# Patient Record
Sex: FEMALE | Race: WHITE | NOT HISPANIC OR LATINO | Employment: FULL TIME | URBAN - METROPOLITAN AREA
[De-identification: names, ages, dates, MRNs, and addresses within clinical notes are randomized per-mention and may not be internally consistent; named-entity substitution may affect disease eponyms.]

---

## 2017-11-24 ENCOUNTER — HOSPITAL ENCOUNTER (EMERGENCY)
Facility: HOSPITAL | Age: 22
Discharge: HOME | End: 2017-11-24
Attending: EMERGENCY MEDICINE | Admitting: EMERGENCY MEDICINE

## 2017-11-24 VITALS
BODY MASS INDEX: 22.43 KG/M2 | SYSTOLIC BLOOD PRESSURE: 123 MMHG | OXYGEN SATURATION: 98 % | HEIGHT: 68 IN | TEMPERATURE: 98.2 F | WEIGHT: 148 LBS | HEART RATE: 68 BPM | DIASTOLIC BLOOD PRESSURE: 91 MMHG | RESPIRATION RATE: 18 BRPM

## 2017-11-24 DIAGNOSIS — N76.0 BACTERIAL VAGINOSIS: Primary | ICD-10-CM

## 2017-11-24 DIAGNOSIS — B96.89 BACTERIAL VAGINOSIS: Primary | ICD-10-CM

## 2017-11-24 LAB
ALBUMIN SERPL-MCNC: 4.5 G/DL (ref 3.5–5.2)
ALBUMIN/GLOB SERPL: 1.5 G/DL
ALP SERPL-CCNC: 66 U/L (ref 39–117)
ALT SERPL W P-5'-P-CCNC: 63 U/L (ref 1–33)
ANION GAP SERPL CALCULATED.3IONS-SCNC: 10.4 MMOL/L
AST SERPL-CCNC: 44 U/L (ref 1–32)
BACTERIA UR QL AUTO: NORMAL /HPF
BASOPHILS # BLD AUTO: 0.02 10*3/MM3 (ref 0–0.2)
BASOPHILS NFR BLD AUTO: 0.4 % (ref 0–1.5)
BILIRUB SERPL-MCNC: 0.8 MG/DL (ref 0.1–1.2)
BILIRUB UR QL STRIP: NEGATIVE
BUN BLD-MCNC: 10 MG/DL (ref 6–20)
BUN/CREAT SERPL: 12.7 (ref 7–25)
CALCIUM SPEC-SCNC: 9.9 MG/DL (ref 8.6–10.5)
CHLORIDE SERPL-SCNC: 103 MMOL/L (ref 98–107)
CLARITY UR: CLEAR
CLUE CELLS SPEC QL WET PREP: ABNORMAL
CO2 SERPL-SCNC: 27.6 MMOL/L (ref 22–29)
COLOR UR: YELLOW
CREAT BLD-MCNC: 0.79 MG/DL (ref 0.57–1)
DEPRECATED RDW RBC AUTO: 40.4 FL (ref 37–54)
EOSINOPHIL # BLD AUTO: 0.1 10*3/MM3 (ref 0–0.7)
EOSINOPHIL NFR BLD AUTO: 1.8 % (ref 0.3–6.2)
ERYTHROCYTE [DISTWIDTH] IN BLOOD BY AUTOMATED COUNT: 12.7 % (ref 11.7–13)
GFR SERPL CREATININE-BSD FRML MDRD: 90 ML/MIN/1.73
GLOBULIN UR ELPH-MCNC: 3.1 GM/DL
GLUCOSE BLD-MCNC: 95 MG/DL (ref 65–99)
GLUCOSE UR STRIP-MCNC: NEGATIVE MG/DL
HCG SERPL QL: NEGATIVE
HCT VFR BLD AUTO: 48.1 % (ref 35.6–45.5)
HGB BLD-MCNC: 16 G/DL (ref 11.9–15.5)
HGB UR QL STRIP.AUTO: NEGATIVE
HOLD SPECIMEN: NORMAL
HYALINE CASTS UR QL AUTO: NORMAL /LPF
HYDATID CYST SPEC WET PREP: ABNORMAL
IMM GRANULOCYTES # BLD: 0 10*3/MM3 (ref 0–0.03)
IMM GRANULOCYTES NFR BLD: 0 % (ref 0–0.5)
KETONES UR QL STRIP: NEGATIVE
KOH PREP NAIL: NORMAL
LEUKOCYTE ESTERASE UR QL STRIP.AUTO: ABNORMAL
LIPASE SERPL-CCNC: 21 U/L (ref 13–60)
LYMPHOCYTES # BLD AUTO: 2.49 10*3/MM3 (ref 0.9–4.8)
LYMPHOCYTES NFR BLD AUTO: 44.9 % (ref 19.6–45.3)
MCH RBC QN AUTO: 29.6 PG (ref 26.9–32)
MCHC RBC AUTO-ENTMCNC: 33.3 G/DL (ref 32.4–36.3)
MCV RBC AUTO: 88.9 FL (ref 80.5–98.2)
MONOCYTES # BLD AUTO: 0.33 10*3/MM3 (ref 0.2–1.2)
MONOCYTES NFR BLD AUTO: 5.9 % (ref 5–12)
NEUTROPHILS # BLD AUTO: 2.61 10*3/MM3 (ref 1.9–8.1)
NEUTROPHILS NFR BLD AUTO: 47 % (ref 42.7–76)
NITRITE UR QL STRIP: NEGATIVE
PH UR STRIP.AUTO: 7.5 [PH] (ref 5–8)
PLATELET # BLD AUTO: 241 10*3/MM3 (ref 140–500)
PMV BLD AUTO: 10.2 FL (ref 6–12)
POTASSIUM BLD-SCNC: 4.4 MMOL/L (ref 3.5–5.2)
PROT SERPL-MCNC: 7.6 G/DL (ref 6–8.5)
PROT UR QL STRIP: NEGATIVE
RBC # BLD AUTO: 5.41 10*6/MM3 (ref 3.9–5.2)
RBC # UR: NORMAL /HPF
REF LAB TEST METHOD: NORMAL
SODIUM BLD-SCNC: 141 MMOL/L (ref 136–145)
SP GR UR STRIP: 1.01 (ref 1–1.03)
SQUAMOUS #/AREA URNS HPF: NORMAL /HPF
T VAGINALIS SPEC QL WET PREP: ABNORMAL
UROBILINOGEN UR QL STRIP: ABNORMAL
WBC NRBC COR # BLD: 5.55 10*3/MM3 (ref 4.5–10.7)
WBC SPEC QL WET PREP: ABNORMAL
WBC UR QL AUTO: NORMAL /HPF
WHOLE BLOOD HOLD SPECIMEN: NORMAL
WHOLE BLOOD HOLD SPECIMEN: NORMAL
YEAST GENITAL QL WET PREP: ABNORMAL

## 2017-11-24 PROCEDURE — 87491 CHLMYD TRACH DNA AMP PROBE: CPT | Performed by: EMERGENCY MEDICINE

## 2017-11-24 PROCEDURE — 99284 EMERGENCY DEPT VISIT MOD MDM: CPT

## 2017-11-24 PROCEDURE — 83690 ASSAY OF LIPASE: CPT | Performed by: EMERGENCY MEDICINE

## 2017-11-24 PROCEDURE — 84703 CHORIONIC GONADOTROPIN ASSAY: CPT | Performed by: EMERGENCY MEDICINE

## 2017-11-24 PROCEDURE — 36415 COLL VENOUS BLD VENIPUNCTURE: CPT | Performed by: EMERGENCY MEDICINE

## 2017-11-24 PROCEDURE — 25010000002 CEFTRIAXONE PER 250 MG: Performed by: EMERGENCY MEDICINE

## 2017-11-24 PROCEDURE — 96372 THER/PROPH/DIAG INJ SC/IM: CPT

## 2017-11-24 PROCEDURE — 85025 COMPLETE CBC W/AUTO DIFF WBC: CPT | Performed by: EMERGENCY MEDICINE

## 2017-11-24 PROCEDURE — 81001 URINALYSIS AUTO W/SCOPE: CPT | Performed by: EMERGENCY MEDICINE

## 2017-11-24 PROCEDURE — 87220 TISSUE EXAM FOR FUNGI: CPT | Performed by: EMERGENCY MEDICINE

## 2017-11-24 PROCEDURE — 80053 COMPREHEN METABOLIC PANEL: CPT | Performed by: EMERGENCY MEDICINE

## 2017-11-24 PROCEDURE — 87591 N.GONORRHOEAE DNA AMP PROB: CPT | Performed by: EMERGENCY MEDICINE

## 2017-11-24 PROCEDURE — 87210 SMEAR WET MOUNT SALINE/INK: CPT | Performed by: EMERGENCY MEDICINE

## 2017-11-24 RX ORDER — AZITHROMYCIN 250 MG/1
1000 TABLET, FILM COATED ORAL ONCE
Status: COMPLETED | OUTPATIENT
Start: 2017-11-24 | End: 2017-11-24

## 2017-11-24 RX ORDER — LIDOCAINE HYDROCHLORIDE 10 MG/ML
1 INJECTION, SOLUTION INFILTRATION; PERINEURAL ONCE
Status: COMPLETED | OUTPATIENT
Start: 2017-11-24 | End: 2017-11-24

## 2017-11-24 RX ORDER — CEFTRIAXONE SODIUM 250 MG/1
250 INJECTION, POWDER, FOR SOLUTION INTRAMUSCULAR; INTRAVENOUS ONCE
Status: COMPLETED | OUTPATIENT
Start: 2017-11-24 | End: 2017-11-24

## 2017-11-24 RX ORDER — SODIUM CHLORIDE 0.9 % (FLUSH) 0.9 %
10 SYRINGE (ML) INJECTION AS NEEDED
Status: DISCONTINUED | OUTPATIENT
Start: 2017-11-24 | End: 2017-11-24 | Stop reason: HOSPADM

## 2017-11-24 RX ORDER — METRONIDAZOLE 500 MG/1
500 TABLET ORAL 2 TIMES DAILY
Qty: 14 TABLET | Refills: 0 | Status: SHIPPED | OUTPATIENT
Start: 2017-11-24 | End: 2018-07-31

## 2017-11-24 RX ADMIN — LIDOCAINE HYDROCHLORIDE 1 ML: 10 INJECTION, SOLUTION INFILTRATION; PERINEURAL at 15:03

## 2017-11-24 RX ADMIN — CEFTRIAXONE SODIUM 250 MG: 250 INJECTION, POWDER, FOR SOLUTION INTRAMUSCULAR; INTRAVENOUS at 15:08

## 2017-11-24 RX ADMIN — AZITHROMYCIN 1000 MG: 250 TABLET, FILM COATED ORAL at 15:01

## 2017-11-24 NOTE — ED PROVIDER NOTES
EMERGENCY DEPARTMENT ENCOUNTER    CHIEF COMPLAINT  Chief Complaint: lower abdominal pain  History given by: pt  History limited by: nothing  Room Number: 25/25  PMD: Loretta Vazquez MD      HPI:  Pt is a 21 y.o. female who presents complaining of lower abdominal cramping which began about a week and a half ago. The pt states that she thought she had a yeast infection and took monistat with no relief. The pt went to an urgent care earlier today who sent her to the ED for further evaluation. The pt states that she has had unusual vaginal discharge (white) for about 2 weeks and nausea. The pt denies dysuria. The pt's last menstrual period was about 2 weeks ago. The pt denies aggravating and alleviating factors. The pt states that she is not currently taking any antibiotics. The pt states that she occasionally drinks EtOH.      Duration:  About a week and a half  Onset: gradual  Timing: constant  Location: lower abdomen  Radiation: none  Quality: cramping  Intensity/Severity: moderate  Progression: unchanged  Associated Symptoms: unusual vaginal discharge (white) and nausea  Aggravating Factors: none  Alleviating Factors: none  Previous Episodes: none  Treatment before arrival: The pt has taken monistat with no relief.    PAST MEDICAL HISTORY  Active Ambulatory Problems     Diagnosis Date Noted   • No Active Ambulatory Problems     Resolved Ambulatory Problems     Diagnosis Date Noted   • No Resolved Ambulatory Problems     Past Medical History:   Diagnosis Date   • ADHD (attention deficit hyperactivity disorder)    • Exercise-induced asthma    • LGSIL of cervix of undetermined significance    • Mitral valve prolapse    • Ovarian cyst    • Pulmonary valve regurgitation    • Rhinitis, chronic    • Scoliosis    • Seasonal allergies        PAST SURGICAL HISTORY  Past Surgical History:   Procedure Laterality Date   • WISDOM TOOTH EXTRACTION         FAMILY HISTORY  Family History   Problem Relation Age of Onset   •  Hypertension Mother    • No Known Problems Father        SOCIAL HISTORY  Social History     Social History   • Marital status: Single     Spouse name: N/A   • Number of children: N/A   • Years of education: N/A     Occupational History   • Not on file.     Social History Main Topics   • Smoking status: Never Smoker   • Smokeless tobacco: Not on file   • Alcohol use No   • Drug use: No   • Sexual activity: Defer     Other Topics Concern   • Not on file     Social History Narrative   • No narrative on file       ALLERGIES  Morphine and related and Adhesive tape    REVIEW OF SYSTEMS  Review of Systems   Constitutional: Negative for fever.   HENT: Negative for sore throat.    Eyes: Negative.    Respiratory: Negative for cough and shortness of breath.    Cardiovascular: Negative for chest pain.   Gastrointestinal: Positive for abdominal pain (lower) and nausea. Negative for diarrhea and vomiting.   Genitourinary: Positive for vaginal discharge. Negative for dysuria.   Musculoskeletal: Negative for neck pain.   Skin: Negative for rash.   Allergic/Immunologic: Negative.    Neurological: Negative for weakness, numbness and headaches.   Hematological: Negative.    Psychiatric/Behavioral: Negative.    All other systems reviewed and are negative.      PHYSICAL EXAM  ED Triage Vitals   Temp Heart Rate Resp BP SpO2   11/24/17 1115 11/24/17 1115 11/24/17 1115 11/24/17 1132 11/24/17 1115   98.9 °F (37.2 °C) 68 20 137/85 98 %      Temp src Heart Rate Source Patient Position BP Location FiO2 (%)   -- -- 11/24/17 1132 11/24/17 1132 --     Sitting Left arm        Physical Exam   Constitutional: She is oriented to person, place, and time. She appears distressed (mild).   HENT:   Head: Normocephalic and atraumatic.   Eyes: EOM are normal. Pupils are equal, round, and reactive to light.   Neck: Normal range of motion.   Cardiovascular: Normal rate, regular rhythm and normal heart sounds.    No murmur heard.  Pulmonary/Chest: Effort  normal and breath sounds normal. No respiratory distress.   Abdominal: Soft. Bowel sounds are normal. She exhibits no distension. There is no tenderness. There is no CVA tenderness.   Genitourinary: Right adnexa normal, left adnexa normal and vulva normal. Uterus is not enlarged, not fixed and not tender. Cervix exhibits motion tenderness and tenderness. Cervix exhibits no lesion. Vagina exhibits exudate. Creamy  acrid  white  green and vaginal discharge found.   Neurological: She is alert and oriented to person, place, and time. She has normal sensation and normal strength.   Skin: Skin is warm and dry.   Psychiatric: Affect normal.   Nursing note and vitals reviewed.      LAB RESULTS  Lab Results (last 24 hours)     Procedure Component Value Units Date/Time    POCT Urinalysis (automated dipstick) [[id number]]  (Abnormal) Collected:  11/24/17 1046    Specimen:  Urine Updated:  11/24/17 1047     Color Yellow     Clarity, UA Clear     Glucose, UA Negative mg/dL      Bilirubin Negative     Ketones, UA Negative     Specific Gravity  1.015     Blood, UA Negative     pH, Urine 7.0     Protein, POC Negative mg/dL      Urobilinogen, UA Normal     Leukocytes Trace (A)     Nitrite, UA Negative    POCT Pregnancy, urine [[id number]]  (Normal) Collected:  11/24/17 1048    Specimen:  Urine Updated:  11/24/17 1049     HCG, Urine, QL Negative     Lot Number [id number]     Internal Positive Control Positive     Internal Negative Control Negative    CBC & Differential [[id number]] Collected:  11/24/17 1147    Specimen:  Blood Updated:  11/24/17 1200    Narrative:       The following orders were created for panel order CBC & Differential.  Procedure                               Abnormality         Status                     ---------                               -----------         ------                     CBC Auto Differential[[id number]]        Abnormal            Final result                 Please view results for these tests on  the individual orders.    Comprehensive Metabolic Panel [[id number]]  (Abnormal) Collected:  11/24/17 1147    Specimen:  Blood Updated:  11/24/17 1218     Glucose 95 mg/dL      BUN 10 mg/dL      Creatinine 0.79 mg/dL      Sodium 141 mmol/L      Potassium 4.4 mmol/L      Chloride 103 mmol/L      CO2 27.6 mmol/L      Calcium 9.9 mg/dL      Total Protein 7.6 g/dL      Albumin 4.50 g/dL      ALT (SGPT) 63 (H) U/L      AST (SGOT) 44 (H) U/L      Alkaline Phosphatase 66 U/L      Total Bilirubin 0.8 mg/dL      eGFR Non African Amer 90 mL/min/1.73      Globulin 3.1 gm/dL      A/G Ratio 1.5 g/dL      BUN/Creatinine Ratio 12.7     Anion Gap 10.4 mmol/L     Lipase [[id number]]  (Normal) Collected:  11/24/17 1147    Specimen:  Blood Updated:  11/24/17 1218     Lipase 21 U/L     hCG, Serum, Qualitative [[id number]]  (Normal) Collected:  11/24/17 1147    Specimen:  Blood Updated:  11/24/17 1219     HCG Qualitative Negative    CBC Auto Differential [[id number]]  (Abnormal) Collected:  11/24/17 1147    Specimen:  Blood Updated:  11/24/17 1200     WBC 5.55 10*3/mm3      RBC 5.41 (H) 10*6/mm3      Hemoglobin 16.0 (H) g/dL      Hematocrit 48.1 (H) %      MCV 88.9 fL      MCH 29.6 pg      MCHC 33.3 g/dL      RDW 12.7 %      RDW-SD 40.4 fl      MPV 10.2 fL      Platelets 241 10*3/mm3      Neutrophil % 47.0 %      Lymphocyte % 44.9 %      Monocyte % 5.9 %      Eosinophil % 1.8 %      Basophil % 0.4 %      Immature Grans % 0.0 %      Neutrophils, Absolute 2.61 10*3/mm3      Lymphocytes, Absolute 2.49 10*3/mm3      Monocytes, Absolute 0.33 10*3/mm3      Eosinophils, Absolute 0.10 10*3/mm3      Basophils, Absolute 0.02 10*3/mm3      Immature Grans, Absolute 0.00 10*3/mm3     Urinalysis With / Culture If Indicated - Urine, Clean Catch [[id number]]  (Abnormal) Collected:  11/24/17 1149    Specimen:  Urine from Urine, Clean Catch Updated:  11/24/17 1202     Color, UA Yellow     Appearance, UA Clear     pH, UA 7.5     Specific [place], UA 1.006      Glucose, UA Negative     Ketones, UA Negative     Bilirubin, UA Negative     Blood, UA Negative     Protein, UA Negative     Leuk Esterase, UA Small (1+) (A)     Nitrite, UA Negative     Urobilinogen, UA 0.2 E.U./dL    Urinalysis, Microscopic Only - Urine, Clean Catch [[id number]] Collected:  11/24/17 1149    Specimen:  Urine from Urine, Clean Catch Updated:  11/24/17 1202     RBC, UA 0-2 /HPF      WBC, UA 0-2 /HPF      Bacteria, UA None Seen /HPF      Squamous Epithelial Cells, UA 0-2 /HPF      Hyaline Casts, UA None Seen /LPF      Methodology Automated Microscopy    Chlamydia trachomatis, Neisseria gonorrhoeae, PCR - Swab, Vagina [[id number]] Collected:  11/24/17 1435    Specimen:  Swab from Vagina Updated:  11/24/17 1442    LEODAN Prep - Swab, Vagina [[id number]]  (Normal) Collected:  11/24/17 1435    Specimen:  Swab from Vagina Updated:  11/24/17 1459     KOH Prep No yeast or hyphal elements seen    Wet Prep, Genital - Swab, Vagina [[id number]]  (Abnormal) Collected:  11/24/17 1435    Specimen:  Swab from Vagina Updated:  11/24/17 1459     YEAST No yeast seen     HYPHAL ELEMENTS No Hyphal elements seen     WBC'S 2+ WBC's seen (A)     Clue Cells, Wet Prep No Clue cells seen     Trichomonas, Wet Prep No Trichomonas seen          I ordered the above labs and reviewed the results      PROCEDURES  Procedures      PROGRESS AND CONSULTS    1135: Ordered labs for further evaluation.     1427: Ordered labs for further evaluation.     1430: Performed pelvic exam with chaperone present.     1527: Rechecked pt, she was resting comfortably. Discussed vaginal swab results which showed bacteria present. Pt's chlamydia and gonorrhoeae results will be back in a few days. Discussed plan to discharge pt with antibiotics. The pt agrees with and understands plan to discharge. All questions were addressed at this time.     MEDICAL DECISION MAKING  Results were reviewed/discussed with the patient and they were also made aware of online  access. Pt also made aware that some labs, such as cultures, will not be resulted during ER visit and follow up with PMD is necessary.     MDM  Number of Diagnoses or Management Options     Amount and/or Complexity of Data Reviewed  Clinical lab tests: ordered and reviewed (WBC: 5.55, Hgb: 16.0, WBC vaginal swab: 2+, Leuk Esterase, UA: small 1+)  Decide to obtain previous medical records or to obtain history from someone other than the patient: yes  Review and summarize past medical records: yes    Patient Progress  Patient progress: stable         DIAGNOSIS  Final diagnoses:   Bacterial vaginosis       DISPOSITION  DISCHARGE    Patient discharged in stable condition.    Reviewed implications of results, diagnosis, meds, responsibility to follow up, warning signs and symptoms of possible worsening, potential complications and reasons to return to ER..    Patient/Family voiced understanding of above instructions.    Discussed plan for discharge, as there is no emergent indication for admission.  Pt/family is agreeable and understands need for follow up and repeat testing.  Pt is aware that discharge does not mean that nothing is wrong but it indicates no emergency is present that requires admission and they must continue care with follow-up as given below or physician of their choice.     FOLLOW-UP  Loretta Vazquez MD  [address] [facility] [address] [phone]      As needed         Medication List      New Prescriptions          metroNIDAZOLE 500 MG tablet   Commonly known as:  FLAGYL   Take 1 tablet by mouth 2 (Two) Times a Day.         Stop          amoxicillin 875 MG tablet   Commonly known as:  AMOXIL       BIOTIN PO       nitrofurantoin (macrocrystal-monohydrate) 100 MG capsule   Commonly known as:  MACROBID       phenazopyridine 200 MG tablet   Commonly known as:  PYRIDIUM               Latest Documented Vital Signs:  As of 8:38 PM  BP- 123/91 HR- 68 Temp- 98.2 °F (36.8 °C) (Oral) O2 sat-  98%    --  Documentation assistance provided by lainey Ng for Dr. Brush.  Information recorded by the scribe was done at my direction and has been verified and validated by me.     Nehal Ng  11/24/17 1529       Kaushik Brush MD  11/24/17 2032

## 2017-11-24 NOTE — ED NOTES
Pt has had cramping on and off 10 days, then vaginal discharge started to smell bad, pt treated with OTC yeast infection medication, but symptoms are still continuing. Pt also has had a cold.       Brunilda Conde RN  11/24/17 3879

## 2017-11-27 LAB
C TRACH RRNA SPEC DONR QL NAA+PROBE: NEGATIVE
N GONORRHOEA DNA SPEC QL NAA+PROBE: NEGATIVE

## 2018-07-31 ENCOUNTER — OFFICE VISIT (OUTPATIENT)
Dept: INTERNAL MEDICINE | Facility: CLINIC | Age: 23
End: 2018-07-31

## 2018-07-31 VITALS
OXYGEN SATURATION: 98 % | SYSTOLIC BLOOD PRESSURE: 110 MMHG | HEART RATE: 75 BPM | WEIGHT: 148 LBS | BODY MASS INDEX: 22.43 KG/M2 | HEIGHT: 68 IN | DIASTOLIC BLOOD PRESSURE: 68 MMHG

## 2018-07-31 DIAGNOSIS — G47.00 INSOMNIA, UNSPECIFIED TYPE: ICD-10-CM

## 2018-07-31 DIAGNOSIS — F90.9 ATTENTION DEFICIT HYPERACTIVITY DISORDER (ADHD), UNSPECIFIED ADHD TYPE: ICD-10-CM

## 2018-07-31 DIAGNOSIS — F41.9 ANXIETY: ICD-10-CM

## 2018-07-31 DIAGNOSIS — S43.014A ANTERIOR DISLOCATION OF RIGHT SHOULDER, INITIAL ENCOUNTER: Primary | ICD-10-CM

## 2018-07-31 DIAGNOSIS — F32.A DEPRESSION, UNSPECIFIED DEPRESSION TYPE: ICD-10-CM

## 2018-07-31 DIAGNOSIS — Z79.899 HIGH RISK MEDICATION USE: ICD-10-CM

## 2018-07-31 PROCEDURE — 99214 OFFICE O/P EST MOD 30 MIN: CPT | Performed by: FAMILY MEDICINE

## 2018-07-31 RX ORDER — BIOTIN 10 MG
1 TABLET ORAL DAILY
COMMUNITY
End: 2022-11-06

## 2018-07-31 NOTE — PROGRESS NOTES
Subjective   Sujey Schofield is a 22 y.o. female.     Chief Complaint   Patient presents with   • ADD     vyvanse previously 50mg but caused severe depression   • Shoulder Injury     right , pops out socket easily   • Insomnia     trouble falling asleep   • Establish Care         History of Present Illness     Patient notes that she has ADHD. She notes that in the past she had Vyvanse and believed she was on a high dose and didn't like the way she felt. She states she ended up in the hospital due to the depression that was related to being on vyvanse. Patient notes that she has had appropriate diagnosis and testing done for adhd while at her previous healthcare at .    Patient notes that she has not been able to sleep well over several weeks. Patient notes that she has a hard time falling asleep.     Patient notes she had a shoulder injury few years back. She notes that she tore her rotator cuff, and notes that since then, she has had many multiple dislocation, which she manually has to place back in. She notes its painful. She says total of 6 dislocations in the past year. She notes that sometimes the shoulder can dislocate when she does activities such as putting a cold on.  She states that one time it dislocated while he was in her sleep.    Patient does not she has some underlying depression over the last few months.  Patient notes that she's having a tough time after her cat .  Patient states that she does feel as if her mood is depressed.  Patient also notes that anxiety and depression with things that she might of had earlier on, but notes that she never truly addressed this.  Patient also notes that she has some underlying anxiety, as she feels that sometimes heart is racing.  Patient is unsure if her anxiety could be contributing to her sleep disturbances.      The following portions of the patient's history were reviewed and updated as appropriate: allergies, current medications, past family  history, past medical history, past social history, past surgical history and problem list.    Review of Systems   Constitutional: Negative for chills and fever.   HENT: Negative for congestion, rhinorrhea, sinus pain and sore throat.    Eyes: Negative for photophobia and visual disturbance.   Respiratory: Negative for cough, chest tightness and shortness of breath.    Cardiovascular: Negative for chest pain and palpitations.   Gastrointestinal: Negative for diarrhea, nausea and vomiting.   Genitourinary: Negative for dysuria, frequency and urgency.   Skin: Negative for rash and wound.   Neurological: Negative for dizziness and syncope.   Psychiatric/Behavioral: Positive for decreased concentration, dysphoric mood and sleep disturbance. Negative for behavioral problems and confusion.       Objective   Physical Exam   Constitutional: She is oriented to person, place, and time. She appears well-developed and well-nourished.   HENT:   Head: Normocephalic and atraumatic.   Right Ear: External ear normal.   Left Ear: External ear normal.   Mouth/Throat: Oropharynx is clear and moist.   Eyes: EOM are normal.   Neck: Normal range of motion. Neck supple.   Cardiovascular: Normal rate, regular rhythm and normal heart sounds.    Pulmonary/Chest: Effort normal and breath sounds normal. No respiratory distress.   Musculoskeletal: Normal range of motion.   Lymphadenopathy:     She has no cervical adenopathy.   Neurological: She is alert and oriented to person, place, and time.   Skin: Skin is warm.   Psychiatric: She has a normal mood and affect. Her behavior is normal.   Nursing note and vitals reviewed.      Assessment/Plan   Sujey was seen today for add, shoulder injury, insomnia and establish care.    Diagnoses and all orders for this visit:    Anterior dislocation of right shoulder, initial encounter  -     Ambulatory Referral to Orthopedic Surgery  -     Advised the patient that she should follow-up with orthopedic to have  evaluation done for her shoulder.  Patient may need further imaging such as an MRI.    Attention deficit hyperactivity disorder (ADHD), unspecified ADHD type        -     At today's office visit is evident the patient most likely has ADHD.  It is evident in conversation with patient at today's visit, that she did easily loses train of thought and can be distracted.  I discussed with patient that if she has been formally diagnosed by her specialist and at the [facility], she should provide those records.  Once the records are received, patient can be prescribed Vyvanse at a lower dose.    Insomnia, unspecified type        -     I discussed patient that her insomnia is most likely related to some underlying depression and anxiety that she may have.  I discussed with her that we will start her on medication which may help the patient.  Will reevaluate in next office visit.    Anxiety  -     Vortioxetine HBr (TRINTELLIX) 10 MG tablet; Take 10 mg by mouth Daily.    Depression, unspecified depression type  -     Vortioxetine HBr (TRINTELLIX) 10 MG tablet; Take 10 mg by mouth Daily.    High risk medication use  -     Comprehensive Metabolic Panel  -     CBC & Differential          No Follow-up on file.    Dictated utilizing Dragon Voice Recognition Software

## 2018-08-01 LAB
ALBUMIN SERPL-MCNC: 4.9 G/DL (ref 3.5–5.2)
ALBUMIN/GLOB SERPL: 1.9 G/DL
ALP SERPL-CCNC: 78 U/L (ref 39–117)
ALT SERPL-CCNC: 20 U/L (ref 1–33)
AST SERPL-CCNC: 22 U/L (ref 1–32)
BASOPHILS # BLD AUTO: 0.03 10*3/MM3 (ref 0–0.2)
BASOPHILS NFR BLD AUTO: 0.4 % (ref 0–1.5)
BILIRUB SERPL-MCNC: 0.5 MG/DL (ref 0.1–1.2)
BUN SERPL-MCNC: 8 MG/DL (ref 6–20)
BUN/CREAT SERPL: 10.1 (ref 7–25)
CALCIUM SERPL-MCNC: 10.3 MG/DL (ref 8.6–10.5)
CHLORIDE SERPL-SCNC: 101 MMOL/L (ref 98–107)
CO2 SERPL-SCNC: 25.3 MMOL/L (ref 22–29)
CREAT SERPL-MCNC: 0.79 MG/DL (ref 0.57–1)
EOSINOPHIL # BLD AUTO: 0.21 10*3/MM3 (ref 0–0.7)
EOSINOPHIL NFR BLD AUTO: 2.5 % (ref 0.3–6.2)
ERYTHROCYTE [DISTWIDTH] IN BLOOD BY AUTOMATED COUNT: 12.2 % (ref 11.7–13)
GLOBULIN SER CALC-MCNC: 2.6 GM/DL
GLUCOSE SERPL-MCNC: 85 MG/DL (ref 65–99)
HCT VFR BLD AUTO: 49.2 % (ref 35.6–45.5)
HGB BLD-MCNC: 15.9 G/DL (ref 11.9–15.5)
IMM GRANULOCYTES # BLD: 0.02 10*3/MM3 (ref 0–0.03)
IMM GRANULOCYTES NFR BLD: 0.2 % (ref 0–0.5)
LYMPHOCYTES # BLD AUTO: 3.99 10*3/MM3 (ref 0.9–4.8)
LYMPHOCYTES NFR BLD AUTO: 47.2 % (ref 19.6–45.3)
MCH RBC QN AUTO: 28.9 PG (ref 26.9–32)
MCHC RBC AUTO-ENTMCNC: 32.3 G/DL (ref 32.4–36.3)
MCV RBC AUTO: 89.3 FL (ref 80.5–98.2)
MONOCYTES # BLD AUTO: 0.46 10*3/MM3 (ref 0.2–1.2)
MONOCYTES NFR BLD AUTO: 5.4 % (ref 5–12)
NEUTROPHILS # BLD AUTO: 3.74 10*3/MM3 (ref 1.9–8.1)
NEUTROPHILS NFR BLD AUTO: 44.3 % (ref 42.7–76)
PLATELET # BLD AUTO: 305 10*3/MM3 (ref 140–500)
POTASSIUM SERPL-SCNC: 4.3 MMOL/L (ref 3.5–5.2)
PROT SERPL-MCNC: 7.5 G/DL (ref 6–8.5)
RBC # BLD AUTO: 5.51 10*6/MM3 (ref 3.9–5.2)
SODIUM SERPL-SCNC: 143 MMOL/L (ref 136–145)
WBC # BLD AUTO: 8.45 10*3/MM3 (ref 4.5–10.7)

## 2018-08-02 ENCOUNTER — TELEPHONE (OUTPATIENT)
Dept: INTERNAL MEDICINE | Facility: CLINIC | Age: 23
End: 2018-08-02

## 2018-08-02 NOTE — TELEPHONE ENCOUNTER
----- Message from Last Kern MD sent at 8/1/2018  9:00 AM EDT -----  The labs were reviewed. Please inform patient that labs were normal.

## 2018-09-04 ENCOUNTER — OFFICE VISIT (OUTPATIENT)
Dept: INTERNAL MEDICINE | Facility: CLINIC | Age: 23
End: 2018-09-04

## 2018-09-04 VITALS
OXYGEN SATURATION: 98 % | HEIGHT: 68 IN | BODY MASS INDEX: 24.44 KG/M2 | DIASTOLIC BLOOD PRESSURE: 64 MMHG | SYSTOLIC BLOOD PRESSURE: 114 MMHG | HEART RATE: 67 BPM | WEIGHT: 161.3 LBS

## 2018-09-04 DIAGNOSIS — F41.9 ANXIETY: ICD-10-CM

## 2018-09-04 DIAGNOSIS — G47.00 INSOMNIA, UNSPECIFIED TYPE: ICD-10-CM

## 2018-09-04 DIAGNOSIS — F32.A DEPRESSION, UNSPECIFIED DEPRESSION TYPE: Primary | ICD-10-CM

## 2018-09-04 PROCEDURE — 99214 OFFICE O/P EST MOD 30 MIN: CPT | Performed by: FAMILY MEDICINE

## 2018-09-04 RX ORDER — BUPROPION HYDROCHLORIDE 300 MG/1
300 TABLET ORAL DAILY
Qty: 30 TABLET | Refills: 6 | Status: SHIPPED | OUTPATIENT
Start: 2018-09-04 | End: 2019-02-20

## 2018-09-05 NOTE — PROGRESS NOTES
Subjective   Sujey Schofield is a 22 y.o. female.     Chief Complaint   Patient presents with   • ADHD   • Anxiety   • Insomnia   • Depression         History of Present Illness     Patient notes that she continues to have insomnia but is not wanting to take any medications for this. Patient believes that its from her anxiety.    Patient notes that she is taking trintellix to help her with the depression and anxiety, however she did note that when she missed some doses, she felt bad. And therefore she stopped taking the medication. Patient does realize that she probably needs to be on medication for depression and anxiety. Patient notes that her depression and anxiety do limit her activities.     The following portions of the patient's history were reviewed and updated as appropriate: allergies, current medications, past family history, past medical history, past social history, past surgical history and problem list.    Review of Systems   Constitutional: Negative for chills and fever.   HENT: Negative for congestion, rhinorrhea, sinus pain and sore throat.    Eyes: Negative for photophobia and visual disturbance.   Respiratory: Negative for cough, chest tightness and shortness of breath.    Cardiovascular: Negative for chest pain and palpitations.   Gastrointestinal: Negative for diarrhea, nausea and vomiting.   Genitourinary: Negative for dysuria, frequency and urgency.   Skin: Negative for rash and wound.   Neurological: Negative for dizziness and syncope.   Psychiatric/Behavioral: Positive for dysphoric mood and sleep disturbance. Negative for behavioral problems and confusion. The patient is nervous/anxious.        Objective   Physical Exam   Constitutional: She is oriented to person, place, and time. She appears well-developed and well-nourished.   HENT:   Head: Normocephalic and atraumatic.   Right Ear: External ear normal.   Left Ear: External ear normal.   Mouth/Throat: Oropharynx is clear and moist.    Eyes: EOM are normal.   Neck: Normal range of motion. Neck supple.   Cardiovascular: Normal rate, regular rhythm and normal heart sounds.    Pulmonary/Chest: Effort normal and breath sounds normal. No respiratory distress.   Musculoskeletal: Normal range of motion.   Lymphadenopathy:     She has no cervical adenopathy.   Neurological: She is alert and oriented to person, place, and time.   Skin: Skin is warm.   Psychiatric: She has a normal mood and affect. Her behavior is normal.   Nursing note and vitals reviewed.      Assessment/Plan   Sujey was seen today for adhd, anxiety, insomnia and depression.    Diagnoses and all orders for this visit:    Depression, unspecified depression type  -     buPROPion XL (WELLBUTRIN XL) 300 MG 24 hr tablet; Take 1 tablet by mouth Daily.    Anxiety  -     buPROPion XL (WELLBUTRIN XL) 300 MG 24 hr tablet; Take 1 tablet by mouth Daily.    Insomnia, unspecified type        -     Discussed good sleep hygiene.           No Follow-up on file.    Dictated utilizing Dragon Voice Recognition Software

## 2018-09-20 ENCOUNTER — APPOINTMENT (OUTPATIENT)
Dept: GENERAL RADIOLOGY | Facility: HOSPITAL | Age: 23
End: 2018-09-20

## 2018-09-20 ENCOUNTER — HOSPITAL ENCOUNTER (EMERGENCY)
Facility: HOSPITAL | Age: 23
Discharge: HOME | End: 2018-09-20
Attending: EMERGENCY MEDICINE | Admitting: EMERGENCY MEDICINE

## 2018-09-20 VITALS
RESPIRATION RATE: 16 BRPM | SYSTOLIC BLOOD PRESSURE: 120 MMHG | DIASTOLIC BLOOD PRESSURE: 73 MMHG | HEIGHT: 67 IN | HEART RATE: 85 BPM | TEMPERATURE: 97.6 F | OXYGEN SATURATION: 100 %

## 2018-09-20 DIAGNOSIS — S43.014A ANTERIOR DISLOCATION OF RIGHT SHOULDER, INITIAL ENCOUNTER: Primary | ICD-10-CM

## 2018-09-20 PROCEDURE — 99283 EMERGENCY DEPT VISIT LOW MDM: CPT

## 2018-09-20 PROCEDURE — 73020 X-RAY EXAM OF SHOULDER: CPT

## 2018-09-20 RX ORDER — LIDOCAINE HYDROCHLORIDE 10 MG/ML
20 INJECTION, SOLUTION EPIDURAL; INFILTRATION; INTRACAUDAL; PERINEURAL ONCE
Status: DISCONTINUED | OUTPATIENT
Start: 2018-09-20 | End: 2018-09-20

## 2018-09-20 RX ORDER — IBUPROFEN 200 MG
800 TABLET ORAL ONCE
Status: COMPLETED | OUTPATIENT
Start: 2018-09-20 | End: 2018-09-20

## 2018-09-20 RX ADMIN — IBUPROFEN 800 MG: 200 TABLET, FILM COATED ORAL at 20:40

## 2018-09-21 NOTE — ED NOTES
MD Mijares at bedside for eval. Xray on the way. Pt reports that while at the gym, her R shoulder became dislocated. She reports that this has happened approx 8 times and she has had it been put into place without sedation at the hospital. Pt appears in pain. Pt's arm propped on pillows for comfort. Reassurance given; call light in reach. Pts breathing even and unlabored. Family at bedside.        Amy Benitez RN  09/20/18 2017

## 2018-09-21 NOTE — ED PROVIDER NOTES
EMERGENCY DEPARTMENT ENCOUNTER    Room Number:  44/44  Date seen:  9/20/2018  Time seen: 8:04 PM  PCP: Last Kern MD  Historian: Patient      HPI:  Chief Complaint: R shoulder injury  Context: Sujey Schofield is a 22 y.o. female who presents to the ED c/o R shoulder injury that occurred today while exercising at the gym at 1945. Pt reports that she has had multiple previous R shoulder dislocation.    Pain Location: R shoulder  Radiation: none  Quality: dislocation  Intensity/Severity: moderate pain  Duration: 30 mins  Onset quality: dislocation  Timing: constant  Progression: unchanged  Aggravating Factors: movement  Alleviating Factors: none  Previous Episodes: hx of previous dislocations of same shoulder  Treatment before arrival: none  Associated Symptoms: none    PAST MEDICAL HISTORY  Active Ambulatory Problems     Diagnosis Date Noted   • Anterior dislocation of right shoulder 07/31/2018   • ADHD (attention deficit hyperactivity disorder) 07/31/2018   • Insomnia 07/31/2018   • Anxiety 07/31/2018   • Depression 07/31/2018     Resolved Ambulatory Problems     Diagnosis Date Noted   • No Resolved Ambulatory Problems     Past Medical History:   Diagnosis Date   • ADHD (attention deficit hyperactivity disorder)    • Exercise-induced asthma    • LGSIL of cervix of undetermined significance    • Mitral valve prolapse    • Ovarian cyst    • Pulmonary valve regurgitation    • Rhinitis, chronic    • Scoliosis    • Seasonal allergies          PAST SURGICAL HISTORY  Past Surgical History:   Procedure Laterality Date   • WISDOM TOOTH EXTRACTION           FAMILY HISTORY  Family History   Problem Relation Age of Onset   • Hypertension Mother    • No Known Problems Father    • Prostate cancer Maternal Grandfather         pancreatic   • Diabetes type II Paternal Grandmother          SOCIAL HISTORY  Social History     Social History   • Marital status: Single     Spouse name: N/A   • Number of children: N/A   • Years of  education: N/A     Occupational History   • Not on file.     Social History Main Topics   • Smoking status: Never Smoker   • Smokeless tobacco: Not on file   • Alcohol use Yes      Comment: 2 drinks week   • Drug use: No   • Sexual activity: Yes     Partners: Male     Other Topics Concern   • Not on file     Social History Narrative   • No narrative on file         ALLERGIES  Morphine and related and Adhesive tape        REVIEW OF SYSTEMS  Review of Systems   Constitutional: Negative for fever.   HENT: Negative for sore throat.    Respiratory: Negative for shortness of breath.    Cardiovascular: Negative for chest pain.   Gastrointestinal: Negative for abdominal pain.   Endocrine: Negative for polyuria.   Genitourinary: Negative for dysuria.   Musculoskeletal: Positive for arthralgias (R shoulder). Negative for neck pain.   Skin: Negative for rash.   Neurological: Negative for headaches.   All other systems reviewed and are negative.           PHYSICAL EXAM  ED Triage Vitals [09/20/18 1956]   Temp Heart Rate Resp BP SpO2   97.6 °F (36.4 °C) 92 26 -- 100 %      Temp src Heart Rate Source Patient Position BP Location FiO2 (%)   Tympanic Monitor -- -- --         GENERAL: not distressed  HENT: nares patent  EYES: no scleral icterus  CV: regular rhythm, regular rate, 2+ right radial pulse with good capillary refill  RESPIRATORY: normal effort  MUSCULOSKELETAL: right shoulder held in abduction, humeral head palpable in anterior location, unable to touch left shoulder with right hand  NEURO: alert, COOPER, FC, normal motor and sensation to axillary nerve and m/r/u nerves  SKIN: warm, dry    Vital signs and nursing notes reviewed.        RADIOLOGY  XR Shoulder 1 View Right   Final Result   LEFT SHOULDER X-RAY     CLINICAL HISTORY: Status post reduction of dislocation.     A single AP view demonstrate satisfactory reduction of the anterior  dislocation shown on previous imaging. No fracture is identified.     This report was  finalized on 9/20/2018 8:43 PM by Dr. Gilles Hardin M.D.   XR Shoulder 1 View Right   Final Result   RIGHT SHOULDER X-RAYS     CLINICAL HISTORY: Right shoulder dislocation.     A single  AP view demonstrates anterior dislocation of the humeral head.  No fracture is identified.     This report was finalized on 9/20/2018 8:42 PM by Dr. Gilles Hardin M.D.             Ordered the above noted radiological studies. Reviewed by me in PACS.          PROCEDURES  Upper Extremity Dislocation  Date/Time: 9/20/2018 8:25 PM  Performed by: DAYLIN FRANCE II  Authorized by: DAYLIN FRANCE II   Consent: Verbal consent obtained.  Risks and benefits: risks, benefits and alternatives were discussed  Consent given by: patient  Patient understanding: patient states understanding of the procedure being performed  Imaging studies: imaging studies available  Patient identity confirmed: verbally with patient, provided demographic data, hospital-assigned identification number and arm band  Injury location: shoulder  Location details: right shoulder  Injury type: dislocation  Dislocation type: anterior  Chronicity: recurrent  Pre-procedure neurovascular assessment: neurovascularly intact  Pre-procedure distal perfusion: normal  Pre-procedure neurological function: normal  Pre-procedure range of motion: reduced    Anesthesia:  Local anesthesia used: no    Sedation:  Patient sedated: no  Manipulation performed: yes  Reduction method: external rotation  Reduction successful: yes  X-ray confirmed reduction: yes  Immobilization: sling  Post-procedure neurovascular assessment: post-procedure neurovascularly intact  Post-procedure distal perfusion: normal  Post-procedure neurological function: normal  Post-procedure range of motion: normal  Patient tolerance: Patient tolerated the procedure well with no immediate complications        MEDICATIONS GIVEN IN ER  Medications   lidocaine PF 1% (XYLOCAINE) injection 20 mL (not administered)    ibuprofen (ADVIL,MOTRIN) tablet 800 mg (800 mg Oral Given 9/20/18 2040)                   PROGRESS AND CONSULTS     8:33 PM  Reduction performed successfully. Post-reduction films ordered.  8:54 PM  Rechecked with pt. Sling in place. Imaging shows successful reduction. Will discharge.     MEDICAL DECISION MAKING      MDM  Number of Diagnoses or Management Options  Anterior dislocation of right shoulder, initial encounter:   Diagnosis management comments: Right shoulder dislocation. No sedation or lidocaine needed. Pt declined. Reduced without complication. Good ROM after reduction. Will f/u with ortho in 1 week. Sling.        Amount and/or Complexity of Data Reviewed  Tests in the radiology section of CPT®: ordered and reviewed (XR R shoulder: successful reduction R shoulder)  Discussion of test results with the performing providers: yes (Dr. Hardin)  Independent visualization of images, tracings, or specimens: yes (Poor quality initial film but right shoulder is anteriorly dislocated)               DIAGNOSIS  Final diagnoses:   Anterior dislocation of right shoulder, initial encounter         DISPOSITION  DISCHARGE    Patient discharged in stable condition.    Reviewed implications of results, diagnosis, meds, responsibility to follow up, warning signs and symptoms of possible worsening, potential complications and reasons to return to ER.    Patient/Family voiced understanding of above instructions.    Discussed plan for discharge, as there is no emergent indication for admission. Patient referred to primary care provider for BP management due to today's BP. Pt/family is agreeable and understands need for follow up and repeat testing.  Pt is aware that discharge does not mean that nothing is wrong but it indicates no emergency is present that requires admission and they must continue care with follow-up as given below or physician of their choice.     FOLLOW-UP  Last Kern MD  [address] [facility]  [address]  [facility] [address]  [phone]    Schedule an appointment as soon as possible for a visit   As needed    [facility]  [address]  [facility] [address]  [phone]  Schedule an appointment as soon as possible for a visit   in 1-3 weeks         Medication List      No changes were made to your prescriptions during this visit.                   Latest Documented Vital Signs:  As of 8:56 PM  BP- 131/91 HR- 92 Temp- 97.6 °F (36.4 °C) (Tympanic) O2 sat- 100%        --  Documentation assistance provided by lainey Ladd for Dr. Dyllan MD.  Information recorded by the scribe was done at my direction and has been verified and validated by me.                 Abdi Ladd  09/20/18 0540       Kevyn Mijares II, MD  09/21/18 [id number]

## 2018-11-21 ENCOUNTER — OFFICE VISIT (OUTPATIENT)
Dept: INTERNAL MEDICINE | Facility: CLINIC | Age: 23
End: 2018-11-21

## 2018-11-21 VITALS
HEART RATE: 72 BPM | OXYGEN SATURATION: 92 % | BODY MASS INDEX: 24.77 KG/M2 | WEIGHT: 157.8 LBS | HEIGHT: 67 IN | DIASTOLIC BLOOD PRESSURE: 62 MMHG | SYSTOLIC BLOOD PRESSURE: 106 MMHG

## 2018-11-21 DIAGNOSIS — K90.41 GLUTEN INTOLERANCE: Primary | ICD-10-CM

## 2018-11-21 PROCEDURE — 99213 OFFICE O/P EST LOW 20 MIN: CPT | Performed by: FAMILY MEDICINE

## 2018-11-21 NOTE — PROGRESS NOTES
Subjective   Sujey Schofield is a 22 y.o. female.     Chief Complaint   Patient presents with   • Digestion Issues   Chief complaint: bloating      History of Present Illness     Patient notes that she feels bloating, nausea, diarrhea, fatigue, gas, and belching. Patient notes that she notices these symptoms after eating gluten products. If she stops eating gluten, she notes that she feels significantly better. Patient does not know if she has a family hx of celiac disease. Patient would like a workup for celiac at todays visit. Patient notes her symptoms have been going on for a year, and her symptoms are getting worse.     The following portions of the patient's history were reviewed and updated as appropriate: allergies, current medications, past family history, past medical history, past social history, past surgical history and problem list.    Review of Systems   Constitutional: Positive for fatigue.   HENT: Negative.    Respiratory: Negative.    Cardiovascular: Negative.    Gastrointestinal: Positive for abdominal distention, diarrhea and nausea.   Genitourinary: Negative.    Musculoskeletal: Negative.    Skin: Negative.    Psychiatric/Behavioral: Negative.        Objective   Physical Exam   Constitutional: She is oriented to person, place, and time. She appears well-developed and well-nourished.   HENT:   Head: Normocephalic and atraumatic.   Right Ear: External ear normal.   Left Ear: External ear normal.   Mouth/Throat: Oropharynx is clear and moist.   Eyes: EOM are normal.   Neck: Normal range of motion. Neck supple.   Cardiovascular: Normal rate, regular rhythm and normal heart sounds.   Pulmonary/Chest: Effort normal and breath sounds normal. No respiratory distress.   Musculoskeletal: Normal range of motion.   Lymphadenopathy:     She has no cervical adenopathy.   Neurological: She is alert and oriented to person, place, and time.   Skin: Skin is warm.   Psychiatric: She has a normal mood and affect.  Her behavior is normal.   Nursing note and vitals reviewed.      Assessment/Plan   Sujey was seen today for digestion issues.    Diagnoses and all orders for this visit:    Gluten intolerance  -     Gliadin Antibody, IgG  -     Gliadin Antibody, IgA  -     Celiac Ab tTG DGP TIgA  -     If testing is positive, can send patient to GI for further testing with biopsy.           No Follow-up on file.    Dictated utilizing Dragon Voice Recognition Software

## 2018-11-22 LAB
ALBUMIN SERPL-MCNC: 4.3 G/DL (ref 3.5–5.2)
ALBUMIN/GLOB SERPL: 1.7 G/DL
ALP SERPL-CCNC: 82 U/L (ref 39–117)
ALT SERPL-CCNC: 16 U/L (ref 1–33)
AST SERPL-CCNC: 17 U/L (ref 1–32)
BASOPHILS # BLD AUTO: 0.02 10*3/MM3 (ref 0–0.2)
BASOPHILS NFR BLD AUTO: 0.3 % (ref 0–1.5)
BILIRUB SERPL-MCNC: 0.4 MG/DL (ref 0.1–1.2)
BUN SERPL-MCNC: 9 MG/DL (ref 6–20)
BUN/CREAT SERPL: 13.4 (ref 7–25)
CALCIUM SERPL-MCNC: 9.9 MG/DL (ref 8.6–10.5)
CHLORIDE SERPL-SCNC: 101 MMOL/L (ref 98–107)
CO2 SERPL-SCNC: 29 MMOL/L (ref 22–29)
CREAT SERPL-MCNC: 0.67 MG/DL (ref 0.57–1)
EOSINOPHIL # BLD AUTO: 0.1 10*3/MM3 (ref 0–0.7)
EOSINOPHIL NFR BLD AUTO: 1.6 % (ref 0.3–6.2)
ERYTHROCYTE [DISTWIDTH] IN BLOOD BY AUTOMATED COUNT: 12.5 % (ref 11.7–13)
GLIADIN PEPTIDE IGA SER-ACNC: 2 UNITS (ref 0–19)
GLIADIN PEPTIDE IGG SER-ACNC: 3 UNITS (ref 0–19)
GLOBULIN SER CALC-MCNC: 2.6 GM/DL
GLUCOSE SERPL-MCNC: 81 MG/DL (ref 65–99)
HCT VFR BLD AUTO: 47.8 % (ref 35.6–45.5)
HGB BLD-MCNC: 15.7 G/DL (ref 11.9–15.5)
IGA SERPL-MCNC: 156 MG/DL (ref 87–352)
IMM GRANULOCYTES # BLD: 0.02 10*3/MM3 (ref 0–0.03)
IMM GRANULOCYTES NFR BLD: 0.3 % (ref 0–0.5)
LYMPHOCYTES # BLD AUTO: 2.28 10*3/MM3 (ref 0.9–4.8)
LYMPHOCYTES NFR BLD AUTO: 36.8 % (ref 19.6–45.3)
MCH RBC QN AUTO: 29.9 PG (ref 26.9–32)
MCHC RBC AUTO-ENTMCNC: 32.8 G/DL (ref 32.4–36.3)
MCV RBC AUTO: 91 FL (ref 80.5–98.2)
MONOCYTES # BLD AUTO: 0.42 10*3/MM3 (ref 0.2–1.2)
MONOCYTES NFR BLD AUTO: 6.8 % (ref 5–12)
NEUTROPHILS # BLD AUTO: 3.35 10*3/MM3 (ref 1.9–8.1)
NEUTROPHILS NFR BLD AUTO: 54.2 % (ref 42.7–76)
PLATELET # BLD AUTO: 259 10*3/MM3 (ref 140–500)
POTASSIUM SERPL-SCNC: 4.7 MMOL/L (ref 3.5–5.2)
PROT SERPL-MCNC: 6.9 G/DL (ref 6–8.5)
RBC # BLD AUTO: 5.25 10*6/MM3 (ref 3.9–5.2)
SODIUM SERPL-SCNC: 141 MMOL/L (ref 136–145)
TTG IGA SER-ACNC: <2 U/ML (ref 0–3)
TTG IGG SER-ACNC: <2 U/ML (ref 0–5)
WBC # BLD AUTO: 6.19 10*3/MM3 (ref 4.5–10.7)

## 2018-11-26 NOTE — PROGRESS NOTES
The labs were reviewed. Please inform patient that labs were normal.  Negative. If symptoms continue, may need to see GI.

## 2018-11-28 ENCOUNTER — TELEPHONE (OUTPATIENT)
Dept: INTERNAL MEDICINE | Facility: CLINIC | Age: 23
End: 2018-11-28

## 2018-11-28 NOTE — TELEPHONE ENCOUNTER
----- Message from Last Kern MD sent at 11/26/2018 10:54 AM EST -----  The labs were reviewed. Please inform patient that labs were normal.  Negative. If symptoms continue, may need to see GI.

## 2019-02-20 ENCOUNTER — OFFICE VISIT (OUTPATIENT)
Dept: FAMILY MEDICINE CLINIC | Facility: CLINIC | Age: 24
End: 2019-02-20

## 2019-02-20 VITALS
WEIGHT: 160 LBS | DIASTOLIC BLOOD PRESSURE: 94 MMHG | BODY MASS INDEX: 25.11 KG/M2 | HEIGHT: 67 IN | OXYGEN SATURATION: 98 % | HEART RATE: 87 BPM | SYSTOLIC BLOOD PRESSURE: 128 MMHG

## 2019-02-20 DIAGNOSIS — F41.9 ANXIETY: ICD-10-CM

## 2019-02-20 DIAGNOSIS — I10 ESSENTIAL HYPERTENSION: Primary | ICD-10-CM

## 2019-02-20 PROCEDURE — 99214 OFFICE O/P EST MOD 30 MIN: CPT | Performed by: NURSE PRACTITIONER

## 2019-02-20 RX ORDER — LISINOPRIL 10 MG/1
10 TABLET ORAL DAILY
Qty: 30 TABLET | Refills: 1 | Status: SHIPPED | OUTPATIENT
Start: 2019-02-20 | End: 2019-04-22

## 2019-02-20 NOTE — PROGRESS NOTES
"Sujey Schofield is a 23 y.o. female.Sujey has had elevated blood pressure in the last month. She had surgery on 1/3/19 and noticed that her BP was elevated.  In the last few days she heard pulsing in her right ear.   Was seeing another [facility] provider but decided to come here.  Has headaches  No family history of HTN    Is seeing a psychiatrist for panic and anxiety next week  Assessment/Plan   Problem List Items Addressed This Visit        Other    Anxiety      Other Visit Diagnoses     Essential hypertension    -  Primary    Relevant Medications    lisinopril (PRINIVIL,ZESTRIL) 10 MG tablet             No Follow-up on file.  Patient Instructions   Hypertension  Hypertension, commonly called high blood pressure, is when the force of blood pumping through the arteries is too strong. The arteries are the blood vessels that carry blood from the heart throughout the body. Hypertension forces the heart to work harder to pump blood and may cause arteries to become narrow or stiff. Having untreated or uncontrolled hypertension can cause heart attacks, strokes, kidney disease, and other problems.  A blood pressure reading consists of a higher number over a lower number. Ideally, your blood pressure should be below 120/80. The first (\"top\") number is called the systolic pressure. It is a measure of the pressure in your arteries as your heart beats. The second (\"bottom\") number is called the diastolic pressure. It is a measure of the pressure in your arteries as the heart relaxes.  What are the causes?  The cause of this condition is not known.  What increases the risk?  Some risk factors for high blood pressure are under your control. Others are not.  Factors you can change  · Smoking.  · Having type 2 diabetes mellitus, high cholesterol, or both.  · Not getting enough exercise or physical activity.  · Being overweight.  · Having too much fat, sugar, calories, or salt (sodium) in your diet.  · Drinking too much " alcohol.  Factors that are difficult or impossible to change  · Having chronic kidney disease.  · Having a family history of high blood pressure.  · Age. Risk increases with age.  · Race. You may be at higher risk if you are -American.  · Gender. Men are at higher risk than women before age 45. After age 65, women are at higher risk than men.  · Having obstructive sleep apnea.  · Stress.  What are the signs or symptoms?  Extremely high blood pressure (hypertensive crisis) may cause:  · Headache.  · Anxiety.  · Shortness of breath.  · Nosebleed.  · Nausea and vomiting.  · Severe chest pain.  · Jerky movements you cannot control (seizures).    How is this diagnosed?  This condition is diagnosed by measuring your blood pressure while you are seated, with your arm resting on a surface. The cuff of the blood pressure monitor will be placed directly against the skin of your upper arm at the level of your heart. It should be measured at least twice using the same arm. Certain conditions can cause a difference in blood pressure between your right and left arms.  Certain factors can cause blood pressure readings to be lower or higher than normal (elevated) for a short period of time:  · When your blood pressure is higher when you are in a health care provider's office than when you are at home, this is called white coat hypertension. Most people with this condition do not need medicines.  · When your blood pressure is higher at home than when you are in a health care provider's office, this is called masked hypertension. Most people with this condition may need medicines to control blood pressure.    If you have a high blood pressure reading during one visit or you have normal blood pressure with other risk factors:  · You may be asked to return on a different day to have your blood pressure checked again.  · You may be asked to monitor your blood pressure at home for 1 week or longer.    If you are diagnosed with  hypertension, you may have other blood or imaging tests to help your health care provider understand your overall risk for other conditions.  How is this treated?  This condition is treated by making healthy lifestyle changes, such as eating healthy foods, exercising more, and reducing your alcohol intake. Your health care provider may prescribe medicine if lifestyle changes are not enough to get your blood pressure under control, and if:  · Your systolic blood pressure is above 130.  · Your diastolic blood pressure is above 80.    Your personal target blood pressure may vary depending on your medical conditions, your age, and other factors.  Follow these instructions at home:  Eating and drinking  · Eat a diet that is high in fiber and potassium, and low in sodium, added sugar, and fat. An example eating plan is called the DASH (Dietary Approaches to Stop Hypertension) diet. To eat this way:  ? Eat plenty of fresh fruits and vegetables. Try to fill half of your plate at each meal with fruits and vegetables.  ? Eat whole grains, such as whole wheat pasta, brown rice, or whole grain bread. Fill about one quarter of your plate with whole grains.  ? Eat or drink low-fat dairy products, such as skim milk or low-fat yogurt.  ? Avoid fatty cuts of meat, processed or cured meats, and poultry with skin. Fill about one quarter of your plate with lean proteins, such as fish, chicken without skin, beans, eggs, and tofu.  ? Avoid premade and processed foods. These tend to be higher in sodium, added sugar, and fat.  · Reduce your daily sodium intake. Most people with hypertension should eat less than 1,500 mg of sodium a day.  · Limit alcohol intake to no more than 1 drink a day for nonpregnant women and 2 drinks a day for men. One drink equals 12 oz of beer, 5 oz of wine, or 1½ oz of hard liquor.  Lifestyle  · Work with your health care provider to maintain a healthy body weight or to lose weight. Ask what an ideal weight is  for you.  · Get at least 30 minutes of exercise that causes your heart to beat faster (aerobic exercise) most days of the week. Activities may include walking, swimming, or biking.  · Include exercise to strengthen your muscles (resistance exercise), such as pilates or lifting weights, as part of your weekly exercise routine. Try to do these types of exercises for 30 minutes at least 3 days a week.  · Do not use any products that contain nicotine or tobacco, such as cigarettes and e-cigarettes. If you need help quitting, ask your health care provider.  · Monitor your blood pressure at home as told by your health care provider.  · Keep all follow-up visits as told by your health care provider. This is important.  Medicines  · Take over-the-counter and prescription medicines only as told by your health care provider. Follow directions carefully. Blood pressure medicines must be taken as prescribed.  · Do not skip doses of blood pressure medicine. Doing this puts you at risk for problems and can make the medicine less effective.  · Ask your health care provider about side effects or reactions to medicines that you should watch for.  Contact a health care provider if:  · You think you are having a reaction to a medicine you are taking.  · You have headaches that keep coming back (recurring).  · You feel dizzy.  · You have swelling in your ankles.  · You have trouble with your vision.  Get help right away if:  · You develop a severe headache or confusion.  · You have unusual weakness or numbness.  · You feel faint.  · You have severe pain in your chest or abdomen.  · You vomit repeatedly.  · You have trouble breathing.  Summary  · Hypertension is when the force of blood pumping through your arteries is too strong. If this condition is not controlled, it may put you at risk for serious complications.  · Your personal target blood pressure may vary depending on your medical conditions, your age, and other factors. For most  "people, a normal blood pressure is less than 120/80.  · Hypertension is treated with lifestyle changes, medicines, or a combination of both. Lifestyle changes include weight loss, eating a healthy, low-sodium diet, exercising more, and limiting alcohol.  This information is not intended to replace advice given to you by your health care provider. Make sure you discuss any questions you have with your health care provider.  Document Released: 12/18/2006 Document Revised: 11/15/2017 Document Reviewed: 11/15/2017  [employer] Interactive Patient Education © 2018 [employer]        Chief Complaint   Patient presents with   • Hypertension     Social History     Tobacco Use   • Smoking status: Never Smoker   • Smokeless tobacco: Never Used   Substance Use Topics   • Alcohol use: Yes     Comment: 5-6 drinks   • Drug use: No       History of Present Illness     The following portions of the patient's history were reviewed and updated as appropriate:PMHroutine: Social history , Past Medical History, Allergies, Current Medications, Active Problem List and Health Maintenance    Review of Systems   Constitutional: Negative for activity change and appetite change.   HENT: Positive for congestion, sinus pressure, sinus pain and sore throat.    Neurological: Positive for headaches. Negative for dizziness.       Objective   Vitals:    02/20/19 1006   BP: 128/94   Pulse: 87   SpO2: 98%   Weight: 72.6 kg (160 lb)   Height: 170.2 cm (67\")     Body mass index is 25.06 kg/m².  Physical Exam   Constitutional: She appears well-developed and well-nourished. No distress.   HENT:   Head: Normocephalic and atraumatic.   Right Ear: External ear normal.   Left Ear: External ear normal.   Eyes: EOM are normal.   Neck: Neck supple. No thyromegaly present.   Cardiovascular: Normal rate, regular rhythm and normal heart sounds.   Pulmonary/Chest: Effort normal and breath sounds normal.   Musculoskeletal: Normal range of motion.   Neurological: She is " alert.   Skin: Skin is warm.   Nursing note and vitals reviewed.    Reviewed Data:  No visits with results within 1 Month(s) from this visit.   Latest known visit with results is:   Office Visit on 11/21/2018   Component Date Value Ref Range Status   • IgA 11/21/2018 156  87 - 352 mg/dL Final   • Gliadin Deamidated Peptide Ab, IgA 11/21/2018 2  0 - 19 units Final    Comment:                    Negative                   0 - 19                     Weak Positive             20 - 30                     Moderate to Strong Positive   >30     • Deaminated Gliadin Ab IgG 11/21/2018 3  0 - 19 units Final    Comment:                    Negative                   0 - 19                     Weak Positive             20 - 30                     Moderate to Strong Positive   >30     • Tissue Transglutaminase IgA 11/21/2018 <2  0 - 3 U/mL Final    Comment:                               Negative        0 -  3                                Weak Positive   4 - 10                                Positive           >10   Tissue Transglutaminase (tTG) has been identified   as the endomysial antigen.  Studies have demonstr-   ated that endomysial IgA antibodies have over 99%   specificity for gluten sensitive enteropathy.     • Tissue Transglutaminase IgG 11/21/2018 <2  0 - 5 U/mL Final    Comment:                               Negative        0 - 5                                Weak Positive   6 - 9                                Positive           >9     • Glucose 11/21/2018 81  65 - 99 mg/dL Final   • BUN 11/21/2018 9  6 - 20 mg/dL Final   • Creatinine 11/21/2018 0.67  0.57 - 1.00 mg/dL Final   • eGFR Non  Am 11/21/2018 108  >60 mL/min/1.73 Final   • eGFR African Am 11/21/2018 131  >60 mL/min/1.73 Final   • BUN/Creatinine Ratio 11/21/2018 13.4  7.0 - 25.0 Final   • Sodium 11/21/2018 141  136 - 145 mmol/L Final   • Potassium 11/21/2018 4.7  3.5 - 5.2 mmol/L Final   • Chloride 11/21/2018 101  98 - 107 mmol/L Final   • Total CO2  11/21/2018 29.0  22.0 - 29.0 mmol/L Final   • Calcium 11/21/2018 9.9  8.6 - 10.5 mg/dL Final   • Total Protein 11/21/2018 6.9  6.0 - 8.5 g/dL Final   • Albumin 11/21/2018 4.30  3.50 - 5.20 g/dL Final   • Globulin 11/21/2018 2.6  gm/dL Final   • A/G Ratio 11/21/2018 1.7  g/dL Final   • Total Bilirubin 11/21/2018 0.4  0.1 - 1.2 mg/dL Final   • Alkaline Phosphatase 11/21/2018 82  39 - 117 U/L Final   • AST (SGOT) 11/21/2018 17  1 - 32 U/L Final   • ALT (SGPT) 11/21/2018 16  1 - 33 U/L Final   • WBC 11/21/2018 6.19  4.50 - 10.70 10*3/mm3 Final   • RBC 11/21/2018 5.25* 3.90 - 5.20 10*6/mm3 Final   • Hemoglobin 11/21/2018 15.7* 11.9 - 15.5 g/dL Final   • Hematocrit 11/21/2018 47.8* 35.6 - 45.5 % Final   • MCV 11/21/2018 91.0  80.5 - 98.2 fL Final   • MCH 11/21/2018 29.9  26.9 - 32.0 pg Final   • MCHC 11/21/2018 32.8  32.4 - 36.3 g/dL Final   • RDW 11/21/2018 12.5  11.7 - 13.0 % Final   • Platelets 11/21/2018 259  140 - 500 10*3/mm3 Final   • Neutrophil Rel % 11/21/2018 54.2  42.7 - 76.0 % Final   • Lymphocyte Rel % 11/21/2018 36.8  19.6 - 45.3 % Final   • Monocyte Rel % 11/21/2018 6.8  5.0 - 12.0 % Final   • Eosinophil Rel % 11/21/2018 1.6  0.3 - 6.2 % Final   • Basophil Rel % 11/21/2018 0.3  0.0 - 1.5 % Final   • Neutrophils Absolute 11/21/2018 3.35  1.90 - 8.10 10*3/mm3 Final   • Lymphocytes Absolute 11/21/2018 2.28  0.90 - 4.80 10*3/mm3 Final   • Monocytes Absolute 11/21/2018 0.42  0.20 - 1.20 10*3/mm3 Final   • Eosinophils Absolute 11/21/2018 0.10  0.00 - 0.70 10*3/mm3 Final   • Basophils Absolute 11/21/2018 0.02  0.00 - 0.20 10*3/mm3 Final   • Immature Granulocyte Rel % 11/21/2018 0.3  0.0 - 0.5 % Final   • Immature Grans Absolute 11/21/2018 0.02  0.00 - 0.03 10*3/mm3 Final

## 2019-02-20 NOTE — PATIENT INSTRUCTIONS

## 2019-03-18 ENCOUNTER — TELEPHONE (OUTPATIENT)
Dept: FAMILY MEDICINE CLINIC | Facility: CLINIC | Age: 24
End: 2019-03-18

## 2019-06-10 ENCOUNTER — OFFICE VISIT (OUTPATIENT)
Dept: FAMILY MEDICINE CLINIC | Facility: CLINIC | Age: 24
End: 2019-06-10

## 2019-06-10 ENCOUNTER — HOSPITAL ENCOUNTER (OUTPATIENT)
Dept: GENERAL RADIOLOGY | Facility: HOSPITAL | Age: 24
Discharge: HOME | End: 2019-06-10
Admitting: NURSE PRACTITIONER

## 2019-06-10 VITALS
HEIGHT: 67 IN | SYSTOLIC BLOOD PRESSURE: 110 MMHG | OXYGEN SATURATION: 98 % | HEART RATE: 67 BPM | BODY MASS INDEX: 24.48 KG/M2 | DIASTOLIC BLOOD PRESSURE: 76 MMHG | WEIGHT: 156 LBS

## 2019-06-10 DIAGNOSIS — R53.83 FATIGUE, UNSPECIFIED TYPE: Primary | ICD-10-CM

## 2019-06-10 DIAGNOSIS — M89.9 BONE LESION: ICD-10-CM

## 2019-06-10 PROCEDURE — 73590 X-RAY EXAM OF LOWER LEG: CPT

## 2019-06-10 PROCEDURE — 99214 OFFICE O/P EST MOD 30 MIN: CPT | Performed by: NURSE PRACTITIONER

## 2019-06-10 NOTE — PROGRESS NOTES
Subjective   Sujey Schofield is a 23 y.o. female.     History of Present Illness Sujey has had right gonzalez pain that is severe and has been present for about 2 years. Pain is present with touch. NKI.     Secondly, she has had fatigue, she is struggling with stress management. She feels grumpy. She has difficulty falling asleep.    The following portions of the patient's history were reviewed and updated as appropriate: allergies, current medications, past family history, past medical history, past social history, past surgical history and problem list.    Review of Systems   Constitutional: Positive for fatigue.   Cardiovascular: Negative for chest pain.   Musculoskeletal: Negative for gait problem.   Skin:        Small palpable bump to middle of rt shin tibial aspect   Neurological: Positive for headaches.       Objective   Physical Exam   Constitutional: She appears well-developed and well-nourished. No distress.   HENT:   Head: Normocephalic and atraumatic.   Right Ear: External ear normal.   Left Ear: External ear normal.   Eyes: EOM are normal.   Neck: Neck supple. No thyromegaly present.   Cardiovascular: Normal rate, regular rhythm and normal heart sounds.   Pulmonary/Chest: Effort normal and breath sounds normal.   Musculoskeletal: Normal range of motion. She exhibits tenderness.   Left middle tibia   Neurological: She is alert.   Skin: Skin is warm.   Nursing note and vitals reviewed.      Assessment/Plan   Sujey was seen today for leg pain and fatigue.    Diagnoses and all orders for this visit:    Fatigue, unspecified type  -     Iron  -     Vitamin B12 and Folate  -     Ferritin  -     CBC and Differential  -     Reticulocytes  -     TSH    Bone lesion  -     XR Tibia Fibula 2 View Right (In Office)

## 2019-06-11 LAB
BASOPHILS # BLD AUTO: 0 X10E3/UL (ref 0–0.2)
BASOPHILS NFR BLD AUTO: 0 %
EOSINOPHIL # BLD AUTO: 0.2 X10E3/UL (ref 0–0.4)
EOSINOPHIL NFR BLD AUTO: 2 %
ERYTHROCYTE [DISTWIDTH] IN BLOOD BY AUTOMATED COUNT: 13.3 % (ref 12.3–15.4)
FERRITIN SERPL-MCNC: 88 NG/ML (ref 15–150)
FOLATE SERPL-MCNC: >20 NG/ML
HCT VFR BLD AUTO: 44 % (ref 34–46.6)
HGB BLD-MCNC: 14.6 G/DL (ref 11.1–15.9)
IMM GRANULOCYTES # BLD AUTO: 0 X10E3/UL (ref 0–0.1)
IMM GRANULOCYTES NFR BLD AUTO: 0 %
IRON SERPL-MCNC: 99 UG/DL (ref 27–159)
LYMPHOCYTES # BLD AUTO: 2.6 X10E3/UL (ref 0.7–3.1)
LYMPHOCYTES NFR BLD AUTO: 29 %
MCH RBC QN AUTO: 28.9 PG (ref 26.6–33)
MCHC RBC AUTO-ENTMCNC: 33.2 G/DL (ref 31.5–35.7)
MCV RBC AUTO: 87 FL (ref 79–97)
MONOCYTES # BLD AUTO: 0.6 X10E3/UL (ref 0.1–0.9)
MONOCYTES NFR BLD AUTO: 6 %
NEUTROPHILS # BLD AUTO: 5.7 X10E3/UL (ref 1.4–7)
NEUTROPHILS NFR BLD AUTO: 63 %
PLATELET # BLD AUTO: 299 X10E3/UL (ref 150–450)
RBC # BLD AUTO: 5.06 X10E6/UL (ref 3.77–5.28)
RETICS/RBC NFR AUTO: 1.7 % (ref 0.6–2.6)
TSH SERPL DL<=0.005 MIU/L-ACNC: 1.64 UIU/ML (ref 0.45–4.5)
VIT B12 SERPL-MCNC: 308 PG/ML (ref 232–1245)
WBC # BLD AUTO: 9 X10E3/UL (ref 3.4–10.8)

## 2019-06-11 NOTE — PATIENT INSTRUCTIONS
Fatigue  If you have fatigue, you feel tired all the time and have a lack of energy or a lack of motivation. Fatigue may make it difficult to start or complete tasks because of exhaustion. In general, occasional or mild fatigue is often a normal response to activity or life. However, long-lasting (chronic) or extreme fatigue may be a symptom of a medical condition.  Follow these instructions at home:  General instructions  · Watch your fatigue for any changes.  · Go to bed and get up at the same time every day.  · Avoid fatigue by pacing yourself during the day and getting enough sleep at night.  · Maintain a healthy weight.  Medicines  · Take over-the-counter and prescription medicines only as told by your health care provider.  · Take a multivitamin, if told by your health care provider.   · Do not use herbal or dietary supplements unless they are approved by your health care provider.  Activity  · Exercise regularly, as told by your health care provider.  · Use or practice techniques to help you relax, such as yoga, bessie chi, meditation, or massage therapy.  Eating and drinking  · Avoid heavy meals in the evening.  · Eat a well-balanced diet, which includes lean proteins, whole grains, plenty of fruits and vegetables, and low-fat dairy products.  · Avoid consuming too much caffeine.  · Avoid the use of alcohol.  · Drink enough fluid to keep your urine pale yellow.  Lifestyle  · Change situations that cause you stress. Try to keep your work and personal schedule in balance.  · Do not use any products that contain nicotine or tobacco, such as cigarettes and e-cigarettes. If you need help quitting, ask your health care provider.  · Do not use drugs.  Contact a health care provider if:  · Your fatigue does not get better.  · You have a fever.  · You suddenly lose or gain weight.  · You have headaches.  · You have trouble falling asleep or sleeping through the night.  · You feel angry, guilty, anxious, or sad.  · You  are unable to have a bowel movement (constipation).  · Your skin is dry.  · You have swelling in your legs or another part of your body.  Get help right away if:  · You feel confused.  · Your vision is blurry.  · You feel faint or you pass out.  · You have a severe headache.  · You have severe pain in your abdomen, your back, or the area between your waist and hips (pelvis).  · You have chest pain, shortness of breath, or an irregular or fast heartbeat.  · You are unable to urinate, or you urinate less than normal.  · You have abnormal bleeding, such as bleeding from the rectum, vagina, nose, lungs, or nipples.  · You vomit blood.  · You have thoughts about hurting yourself or others.  If you ever feel like you may hurt yourself or others, or have thoughts about taking your own life, get help right away. You can go to your nearest emergency department or call:  · Your local emergency services (911 in the [place]  · A suicide crisis helpline, such as the National Suicide Prevention Lifeline at 1-494.437.2718. This is open 24 hours a day.    Summary  · If you have fatigue, you feel tired all the time and have a lack of energy or a lack of motivation.  · Fatigue may make it difficult to start or complete tasks because of exhaustion.  · Long-lasting (chronic) or extreme fatigue may be a symptom of a medical condition.  · Exercise regularly, as told by your health care provider.  · Change situations that cause you stress. Try to keep your work and personal schedule in balance.  This information is not intended to replace advice given to you by your health care provider. Make sure you discuss any questions you have with your health care provider.  Document Released: 10/14/2008 Document Revised: 09/12/2018 Document Reviewed: 09/12/2018  "[employer]" Interactive Patient Education © 2019 [employer]

## 2019-06-20 ENCOUNTER — OFFICE VISIT (OUTPATIENT)
Dept: FAMILY MEDICINE CLINIC | Facility: CLINIC | Age: 24
End: 2019-06-20

## 2019-06-20 VITALS
BODY MASS INDEX: 24.96 KG/M2 | SYSTOLIC BLOOD PRESSURE: 110 MMHG | OXYGEN SATURATION: 98 % | HEART RATE: 74 BPM | DIASTOLIC BLOOD PRESSURE: 62 MMHG | HEIGHT: 67 IN | WEIGHT: 159 LBS

## 2019-06-20 DIAGNOSIS — L02.214 ABSCESS OF GROIN, RIGHT: Primary | ICD-10-CM

## 2019-06-20 PROCEDURE — 99213 OFFICE O/P EST LOW 20 MIN: CPT | Performed by: NURSE PRACTITIONER

## 2019-06-20 RX ORDER — CLINDAMYCIN HYDROCHLORIDE 300 MG/1
300 CAPSULE ORAL 3 TIMES DAILY
Qty: 30 CAPSULE | Refills: 0 | OUTPATIENT
Start: 2019-06-20 | End: 2019-09-20

## 2019-06-20 NOTE — PATIENT INSTRUCTIONS
Skin Abscess  A skin abscess is an infected area on or under your skin that contains pus and other material. An abscess can happen almost anywhere on your body. Some abscesses break open (rupture) on their own. Most continue to get worse unless they are treated. The infection can spread deeper into the body and into your blood, which can make you feel sick. Treatment usually involves draining the abscess.  Follow these instructions at home:  Abscess Care  · If you have an abscess that has not drained, place a warm, clean, wet washcloth over the abscess several times a day. Do this as told by your doctor.  · Follow instructions from your doctor about how to take care of your abscess. Make sure you:  ? Cover the abscess with a bandage (dressing).  ? Change your bandage or gauze as told by your doctor.  ? Wash your hands with soap and water before you change the bandage or gauze. If you cannot use soap and water, use hand .  · Check your abscess every day for signs that the infection is getting worse. Check for:  ? More redness, swelling, or pain.  ? More fluid or blood.  ? Warmth.  ? More pus or a bad smell.  Medicines    · Take over-the-counter and prescription medicines only as told by your doctor.  · If you were prescribed an antibiotic medicine, take it as told by your doctor. Do not stop taking the antibiotic even if you start to feel better.  General instructions  · To avoid spreading the infection:  ? Do not share personal care items, towels, or hot tubs with others.  ? Avoid making skin-to-skin contact with other people.  · Keep all follow-up visits as told by your doctor. This is important.  Contact a doctor if:  · You have more redness, swelling, or pain around your abscess.  · You have more fluid or blood coming from your abscess.  · Your abscess feels warm when you touch it.  · You have more pus or a bad smell coming from your abscess.  · You have a fever.  · Your muscles ache.  · You have  chills.  · You feel sick.  Get help right away if:  · You have very bad (severe) pain.  · You see red streaks on your skin spreading away from the abscess.  This information is not intended to replace advice given to you by your health care provider. Make sure you discuss any questions you have with your health care provider.  Document Released: 06/05/2009 Document Revised: 08/13/2017 Document Reviewed: 10/26/2016  [employer] Interactive Patient Education © 2019 [employer].

## 2019-06-20 NOTE — PROGRESS NOTES
Subjective   Sujey Schofield is a 23 y.o. female.     History of Present Illness Sujey states that she has a sore on her labia that has been present for one week.     The following portions of the patient's history were reviewed and updated as appropriate: allergies, current medications, past family history, past medical history, past social history, past surgical history and problem list.    Review of Systems   Constitutional: Positive for activity change. Negative for appetite change and fever.       Objective   Physical Exam   Constitutional: She appears well-developed and well-nourished.   Genitourinary:   Genitourinary Comments: Small area of fluctuance to rt lateral labia majora   Musculoskeletal: Normal range of motion.   Neurological: She is alert.   Skin: Skin is warm.   Nursing note and vitals reviewed.      Assessment/Plan   Sujey was seen today for ingrown hair.    Diagnoses and all orders for this visit:    Abscess of groin, right    Other orders  -     clindamycin (CLEOCIN) 300 MG capsule; Take 1 capsule by mouth 3 (Three) Times a Day.

## 2019-07-31 ENCOUNTER — TELEPHONE (OUTPATIENT)
Dept: FAMILY MEDICINE CLINIC | Facility: CLINIC | Age: 24
End: 2019-07-31

## 2019-07-31 PROBLEM — Z98.890 STATUS POST LABRAL REPAIR OF SHOULDER: Status: ACTIVE | Noted: 2019-01-16

## 2019-07-31 PROBLEM — M25.311 SHOULDER INSTABILITY, RIGHT: Status: ACTIVE | Noted: 2018-10-10

## 2019-07-31 PROBLEM — S43.439A GLENOID LABRUM TEAR: Status: ACTIVE | Noted: 2018-10-31

## 2019-11-06 ENCOUNTER — TELEPHONE (OUTPATIENT)
Dept: OBSTETRICS AND GYNECOLOGY | Age: 24
End: 2019-11-06

## 2019-11-06 ENCOUNTER — OFFICE VISIT (OUTPATIENT)
Dept: FAMILY MEDICINE CLINIC | Facility: CLINIC | Age: 24
End: 2019-11-06

## 2019-11-06 VITALS
OXYGEN SATURATION: 99 % | HEIGHT: 67 IN | WEIGHT: 165 LBS | HEART RATE: 82 BPM | BODY MASS INDEX: 25.9 KG/M2 | DIASTOLIC BLOOD PRESSURE: 70 MMHG | SYSTOLIC BLOOD PRESSURE: 120 MMHG

## 2019-11-06 DIAGNOSIS — R10.2 PELVIC PAIN: Primary | ICD-10-CM

## 2019-11-06 DIAGNOSIS — R30.0 DYSURIA: ICD-10-CM

## 2019-11-06 LAB
BILIRUB BLD-MCNC: NEGATIVE MG/DL
CLARITY, POC: ABNORMAL
COLOR UR: YELLOW
GLUCOSE UR STRIP-MCNC: NEGATIVE MG/DL
KETONES UR QL: NEGATIVE
LEUKOCYTE EST, POC: ABNORMAL
PH UR: 7 [PH] (ref 5–8)
PROT UR STRIP-MCNC: ABNORMAL MG/DL
RBC # UR STRIP: NEGATIVE /UL
SP GR UR: 1.01 (ref 1–1.03)
UROBILINOGEN UR QL: NORMAL

## 2019-11-06 PROCEDURE — 99213 OFFICE O/P EST LOW 20 MIN: CPT | Performed by: NURSE PRACTITIONER

## 2019-11-06 PROCEDURE — 81002 URINALYSIS NONAUTO W/O SCOPE: CPT | Performed by: NURSE PRACTITIONER

## 2019-11-06 RX ORDER — LEVOCETIRIZINE DIHYDROCHLORIDE 5 MG/1
5 TABLET, FILM COATED ORAL EVERY EVENING
COMMUNITY
End: 2022-01-19

## 2019-11-06 RX ORDER — SULFAMETHOXAZOLE AND TRIMETHOPRIM 800; 160 MG/1; MG/1
1 TABLET ORAL 2 TIMES DAILY
Qty: 14 TABLET | Refills: 0 | OUTPATIENT
Start: 2019-11-06 | End: 2020-03-01

## 2019-11-06 NOTE — PROGRESS NOTES
Subjective fatigue chest pain abdominal pain  Sujey Schofield is a 23 y.o. female.     History of Present Illness   Various complaints that have been going on for several months, she has not tried any medicine and has not been seen.  The following portions of the patient's history were reviewed and updated as appropriate: allergies, current medications, past family history, past medical history, past social history, past surgical history and problem list.    Review of Systems   Constitutional: Positive for fatigue.   Cardiovascular: Positive for chest pain.   Gastrointestinal: Positive for abdominal pain.   Genitourinary: Positive for pelvic pain and vaginal discharge.   Musculoskeletal: Positive for neck pain.   Skin: Positive for rash.   Neurological: Positive for headache.   Psychiatric/Behavioral:        Anxiety       Objective   Physical Exam   Constitutional: She appears well-developed and well-nourished. No distress.   HENT:   Head: Normocephalic and atraumatic.   Eyes: EOM are normal.   Cardiovascular: Normal rate and regular rhythm.   Pulmonary/Chest: Effort normal and breath sounds normal.   Abdominal: Soft.   Nursing note and vitals reviewed.      Vitals:    11/06/19 1109   BP: 120/70   Pulse: 82   SpO2: 99%     Body mass index is 25.84 kg/m².    Procedures    Assessment/Plan   Problems Addressed this Visit     None      Visit Diagnoses     Pelvic pain    -  Primary    Relevant Orders    Ambulatory Referral to Obstetrics / Gynecology    Urine Culture - Urine, Urine, Clean Catch    Dysuria        Relevant Orders    POCT urinalysis dipstick, manual (Completed)

## 2019-11-06 NOTE — PATIENT INSTRUCTIONS
Pelvic Pain, Female  Pelvic pain is pain in your lower abdomen, below your belly button and between your hips. The pain may start suddenly (be acute), keep coming back (be recurring), or last a long time (become chronic). Pelvic pain that lasts longer than 6 months is considered chronic.  Pelvic pain may affect your:  · Reproductive organs.  · Urinary system.  · Digestive tract.  · Musculoskeletal system.  There are many potential causes of pelvic pain. Sometimes, the pain can be a result of digestive or urinary conditions, strained muscles or ligaments, or reproductive conditions. Sometimes the cause of pelvic pain is not known.  Follow these instructions at home:    · Take over-the-counter and prescription medicines only as told by your health care provider.  · Rest as told by your health care provider.  · Do not have sex if it hurts.  · Keep a journal of your pelvic pain. Write down:  ? When the pain started.  ? Where the pain is located.  ? What seems to make the pain better or worse, such as food or your period (menstrual cycle).  ? Any symptoms you have along with the pain.  · Keep all follow-up visits as told by your health care provider. This is important.  Contact a health care provider if:  · Medicine does not help your pain.  · Your pain comes back.  · You have new symptoms.  · You have abnormal vaginal discharge or bleeding, including bleeding after menopause.  · You have a fever or chills.  · You are constipated.  · You have blood in your urine or stool.  · You have foul-smelling urine.  · You feel weak or light-headed.  Get help right away if:  · You have sudden severe pain.  · Your pain gets steadily worse.  · You have severe pain along with fever, nausea, vomiting, or excessive sweating.  · You lose consciousness.  Summary  · Pelvic pain is pain in your lower abdomen, below your belly button and between your hips.  · There are many potential causes of pelvic pain.  · Keep a journal of your pelvic  pain.  This information is not intended to replace advice given to you by your health care provider. Make sure you discuss any questions you have with your health care provider.  Document Released: 11/14/2005 Document Revised: 06/05/2019 Document Reviewed: 06/05/2019  [employer] Interactive Patient Education © 2019 [employer]

## 2019-11-06 NOTE — TELEPHONE ENCOUNTER
LMTC 11/6/19 to sched NGYN appt w Dr Levy for h/o pelvic pain w discharge (treated) . Plz offer Fri 2/28/19 at 130 w Dr Levy    Referral received for new gyn pt needing to est care w pelvic pain concerns.    Referred by:  Felecia Mandujano    Requesting Dr Levy.

## 2019-11-08 LAB
BACTERIA UR CULT: NORMAL
BACTERIA UR CULT: NORMAL

## 2020-02-05 ENCOUNTER — OFFICE VISIT (OUTPATIENT)
Dept: FAMILY MEDICINE CLINIC | Facility: CLINIC | Age: 25
End: 2020-02-05

## 2020-02-05 VITALS
DIASTOLIC BLOOD PRESSURE: 80 MMHG | SYSTOLIC BLOOD PRESSURE: 110 MMHG | TEMPERATURE: 98.2 F | WEIGHT: 173 LBS | BODY MASS INDEX: 27.15 KG/M2 | RESPIRATION RATE: 18 BRPM | HEIGHT: 67 IN | HEART RATE: 66 BPM | OXYGEN SATURATION: 99 %

## 2020-02-05 DIAGNOSIS — G43.809 OTHER MIGRAINE WITHOUT STATUS MIGRAINOSUS, NOT INTRACTABLE: Primary | ICD-10-CM

## 2020-02-05 PROCEDURE — 99213 OFFICE O/P EST LOW 20 MIN: CPT | Performed by: NURSE PRACTITIONER

## 2020-02-05 NOTE — PROGRESS NOTES
Subjective SAD  Sujey Schofield is a 24 y.o. female.     History of Present Illness   Thinks she may have seasonal affective disorder. Has zero energy.Her migraines have also gotten worse.  Her migraines are coming daily and she has some photophobia.  She takes excedrin migraine which helps some  The following portions of the patient's history were reviewed and updated as appropriate: allergies, current medications, past family history, past medical history, past social history, past surgical history and problem list.    Review of Systems   Constitutional: Positive for appetite change.   Musculoskeletal: Positive for neck stiffness.   Neurological: Positive for weakness and headache.       Objective   Physical Exam   Constitutional: She is oriented to person, place, and time. She appears well-developed and well-nourished. She appears distressed.   HENT:   Head: Normocephalic and atraumatic.   Mouth/Throat: Oropharynx is clear and moist.   Eyes: Pupils are equal, round, and reactive to light. EOM are normal.   Musculoskeletal: Normal range of motion.   Neurological: She is alert and oriented to person, place, and time. No cranial nerve deficit or sensory deficit. She exhibits normal muscle tone. Coordination normal.   Skin: Skin is warm.   Nursing note and vitals reviewed.        Assessment/Plan   Problem List Items Addressed This Visit     None      Visit Diagnoses     Other migraine without status migrainosus, not intractable    -  Primary    Relevant Orders    Ambulatory Referral to Neurology (Completed)               Return if symptoms worsen or fail to improve.

## 2020-02-28 ENCOUNTER — OFFICE VISIT (OUTPATIENT)
Dept: OBSTETRICS AND GYNECOLOGY | Age: 25
End: 2020-02-28

## 2020-02-28 VITALS
HEIGHT: 67 IN | SYSTOLIC BLOOD PRESSURE: 120 MMHG | WEIGHT: 161 LBS | DIASTOLIC BLOOD PRESSURE: 68 MMHG | BODY MASS INDEX: 25.27 KG/M2

## 2020-02-28 DIAGNOSIS — Z01.419 ENCOUNTER FOR GYNECOLOGICAL EXAMINATION WITHOUT ABNORMAL FINDING: Primary | ICD-10-CM

## 2020-02-28 DIAGNOSIS — Z30.431 ENCOUNTER FOR ROUTINE CHECKING OF INTRAUTERINE CONTRACEPTIVE DEVICE (IUD): ICD-10-CM

## 2020-02-28 DIAGNOSIS — Z11.3 SCREEN FOR STD (SEXUALLY TRANSMITTED DISEASE): ICD-10-CM

## 2020-02-28 PROCEDURE — 99385 PREV VISIT NEW AGE 18-39: CPT | Performed by: OBSTETRICS & GYNECOLOGY

## 2020-02-28 RX ORDER — CHLORAL HYDRATE 500 MG
CAPSULE ORAL
COMMUNITY
End: 2022-06-30

## 2020-02-28 NOTE — PROGRESS NOTES
"Subjective   Sujey Schofield is a 24 y.o. female new patient - pt wants to establish care today , last pap 05/24/2019 at [facility] by dr bower pap was neg , not having any complaints , paragard inserted 5 yrs ago , periods are heavier for first 3 days and than next couple of days are just spotting , pt is happy with the iud , denies discharge or any abdominal pain.  Works as  at [employer].    History of Present Illness    The following portions of the patient's history were reviewed and updated as appropriate: allergies, current medications, past family history, past medical history, past social history, past surgical history and problem list.    Review of Systems   Constitutional: Negative for chills, fatigue and fever.   Gastrointestinal: Negative for abdominal distention and abdominal pain.   Genitourinary: Negative for dyspareunia, dysuria, menstrual problem, pelvic pain, vaginal bleeding, vaginal discharge and vaginal pain.   All other systems reviewed and are negative.  /68   Ht 170.2 cm (67.01\")   Wt 73 kg (161 lb)   LMP 02/28/2020   Breastfeeding No   BMI 25.21 kg/m²       Objective   Physical Exam   Constitutional: She is oriented to person, place, and time. She appears well-developed and well-nourished.   Neck: Normal range of motion. Neck supple. No thyromegaly present.   Cardiovascular: Normal rate and regular rhythm.   Pulmonary/Chest: Effort normal and breath sounds normal. Right breast exhibits no mass, no nipple discharge, no skin change and no tenderness. Left breast exhibits no mass, no nipple discharge, no skin change and no tenderness.   Abdominal: Soft. Bowel sounds are normal. She exhibits no distension. There is no tenderness.   Genitourinary: Vagina normal and uterus normal. Rectal exam shows fissure. Pelvic exam was performed with patient supine. Uterus is not tender. Cervix exhibits no friability. Right adnexum displays no mass and no tenderness. Left adnexum displays no " mass and no tenderness. No vaginal discharge found.   Genitourinary Comments: IUD strings visualized   Chronic, draining hair follicle sinus - advised aquaphor    Musculoskeletal: Normal range of motion. She exhibits no edema.   Neurological: She is alert and oriented to person, place, and time.   Skin: Skin is warm and dry. No rash noted.   Psychiatric: She has a normal mood and affect. Her behavior is normal.   Nursing note and vitals reviewed.        Assessment/Plan   Sujey was seen today for establish care and gynecologic exam.    Diagnoses and all orders for this visit:    Encounter for gynecological examination without abnormal finding    Encounter for routine checking of intrauterine contraceptive device (IUD)    Screen for STD (sexually transmitted disease)  -     RPR  -     HIV-1 / O / 2 Ag / Antibody 4th Generation  -     Hepatitis C Antibody  -     Hepatitis B Surface Antigen      Counseling was given to patient for the following topics: instructions for management, risk factor reductions and self-breast exams  .

## 2020-02-29 LAB
HBV SURFACE AG SERPL QL IA: NEGATIVE
HCV AB S/CO SERPL IA: 0.1 S/CO RATIO (ref 0–0.9)
HIV 1+2 AB+HIV1 P24 AG SERPL QL IA: NON REACTIVE
RPR SER QL: NORMAL

## 2020-03-03 LAB
A VAGINAE DNA VAG QL NAA+PROBE: NORMAL SCORE
BVAB2 DNA VAG QL NAA+PROBE: NORMAL SCORE
C ALBICANS DNA VAG QL NAA+PROBE: NEGATIVE
C GLABRATA DNA VAG QL NAA+PROBE: NEGATIVE
C TRACH DNA VAG QL NAA+PROBE: NEGATIVE
MEGA1 DNA VAG QL NAA+PROBE: NORMAL SCORE
N GONORRHOEA DNA VAG QL NAA+PROBE: NEGATIVE
T VAGINALIS DNA VAG QL NAA+PROBE: NEGATIVE

## 2020-03-06 ENCOUNTER — TELEPHONE (OUTPATIENT)
Dept: OBSTETRICS AND GYNECOLOGY | Age: 25
End: 2020-03-06

## 2020-03-06 NOTE — TELEPHONE ENCOUNTER
----- Message from Gricelda Levy MD sent at 3/3/2020  8:42 AM EST -----  Please call patient and notify of negative results of all STD testing

## 2020-03-11 NOTE — TELEPHONE ENCOUNTER
Left message for patient letting her know results from last visit normal , any questions call the office .

## 2020-06-30 ENCOUNTER — TELEPHONE (OUTPATIENT)
Dept: FAMILY MEDICINE CLINIC | Facility: CLINIC | Age: 25
End: 2020-06-30

## 2020-06-30 NOTE — TELEPHONE ENCOUNTER
Spoke with patient she has no fever, she is going to call the urgent care in [place] and speak with them.

## 2020-06-30 NOTE — TELEPHONE ENCOUNTER
Patient called and stated that she is unsure if she should be tested for covid-19 or if she just has a common cold. The patient states that she is experiencing the following;     - some shortness of breath, she is out of breath a lot but is not struggling to breath   - sore throat  -muscle aches  -chills  -runny nose  -congestion   -head ache  -nausea     The patient states that she has been feeling this way for about 2 days that she has had noticed. She states she has had a head ache over the weekend but didn't think much of it. Please advise.     Patient call back [phone]

## 2020-09-28 ENCOUNTER — OFFICE VISIT (OUTPATIENT)
Dept: OBSTETRICS AND GYNECOLOGY | Age: 25
End: 2020-09-28

## 2020-09-28 VITALS
HEIGHT: 68 IN | SYSTOLIC BLOOD PRESSURE: 118 MMHG | DIASTOLIC BLOOD PRESSURE: 68 MMHG | BODY MASS INDEX: 25.46 KG/M2 | WEIGHT: 168 LBS

## 2020-09-28 DIAGNOSIS — L02.91 ABSCESS: Primary | ICD-10-CM

## 2020-09-28 DIAGNOSIS — Z11.3 SCREEN FOR STD (SEXUALLY TRANSMITTED DISEASE): ICD-10-CM

## 2020-09-28 PROCEDURE — 99213 OFFICE O/P EST LOW 20 MIN: CPT | Performed by: PHYSICIAN ASSISTANT

## 2020-09-28 RX ORDER — SULFAMETHOXAZOLE AND TRIMETHOPRIM 800; 160 MG/1; MG/1
1 TABLET ORAL 2 TIMES DAILY
Qty: 20 TABLET | Refills: 0 | Status: SHIPPED | OUTPATIENT
Start: 2020-09-28 | End: 2020-10-08

## 2020-09-28 NOTE — PROGRESS NOTES
"Subjective     Chief Complaint   Patient presents with   • Gynecologic Exam     c/o scar in right groin area that is bothering her, bleeding.       Sujey Schofield is a 24 y.o. No obstetric history on file. whose LMP is Patient's last menstrual period was 09/07/2020 (approximate). presents with pain in the groin where she has a scar  She is noting that it was stable but is now draining and painful  Started with sx's last week  Inflamed and then opened and started draining  She is using alcohol and H202 and soap and water to treat it    She would like to pursue removal if possible    Also requesting STD testing  Denies sx's but wants to be on the safe side    Has IUD in place  Pt of Dr Levy      No Additional Complaints Reported    The following portions of the patient's history were reviewed and updated as appropriate:vital signs, allergies, current medications, past family history, past medical history, past social history, past surgical history and problem list      Review of Systems   Genitourinary:draining hairfollicle    Objective      /68   Ht 172.7 cm (68\")   Wt 76.2 kg (168 lb)   LMP 09/07/2020 (Approximate)   Breastfeeding No   BMI 25.54 kg/m²     Physical Exam    General:   alert, comfortable and no distress   Heart: Not performed today   Lungs: Not performed today.   Breast: Not performed today   Neck: na   Abdomen: {Not performed today   CVA: Not performed today   Pelvis: External genitalia: erosions and see pic  Vaginal: discharge, white  Cervix: normal appearance and IUD string visualized   Extremities: Not performed today   Neurologic: negative   Psychiatric: Normal affect, judgement, and mood       Lab Review   Labs: No data reviewed     Imaging   No data reviewed    Assessment/Plan     ASSESSMENT  1. Abscess    2. Screen for STD (sexually transmitted disease)        PLAN  1.   Orders Placed This Encounter   Procedures   • NuSwab VG+ - Swab, Vagina   • Anaerobic & Aerobic Culture ([facility] " Only) - Swab, Groin, right   • Ambulatory Referral to General Surgery       2. Medications prescribed this encounter:        New Medications Ordered This Visit   Medications   • sulfamethoxazole-trimethoprim (Bactrim DS) 800-160 MG per tablet     Sig: Take 1 tablet by mouth 2 (Two) Times a Day for 10 days.     Dispense:  20 tablet     Refill:  0       3. Start antibiotic for abscess. Plan aerobic and anaerobic cultures as well. Sent std testing. Plan f/u with surgeon for eval and possible excisions. Soap and water to clean area, avoid alcohol or H202    Follow up: 6 month(s)    BRUNILDA Dobbins  9/28/2020

## 2020-10-01 LAB
A VAGINAE DNA VAG QL NAA+PROBE: ABNORMAL SCORE
BVAB2 DNA VAG QL NAA+PROBE: ABNORMAL SCORE
C ALBICANS DNA VAG QL NAA+PROBE: POSITIVE
C GLABRATA DNA VAG QL NAA+PROBE: NEGATIVE
C TRACH DNA VAG QL NAA+PROBE: NEGATIVE
MEGA1 DNA VAG QL NAA+PROBE: ABNORMAL SCORE
N GONORRHOEA DNA VAG QL NAA+PROBE: NEGATIVE
T VAGINALIS DNA VAG QL NAA+PROBE: NEGATIVE

## 2020-10-01 RX ORDER — FLUCONAZOLE 150 MG/1
150 TABLET ORAL DAILY
Qty: 1 TABLET | Refills: 0 | Status: SHIPPED | OUTPATIENT
Start: 2020-10-01 | End: 2021-03-02

## 2020-10-02 LAB
BACTERIA SPEC AEROBE CULT: ABNORMAL
BACTERIA SPEC ANAEROBE CULT: ABNORMAL
BACTERIA SPEC CULT: ABNORMAL
BACTERIA SPEC CULT: ABNORMAL

## 2021-03-02 ENCOUNTER — OFFICE VISIT (OUTPATIENT)
Dept: FAMILY MEDICINE CLINIC | Facility: CLINIC | Age: 26
End: 2021-03-02

## 2021-03-02 VITALS
RESPIRATION RATE: 14 BRPM | HEART RATE: 78 BPM | OXYGEN SATURATION: 99 % | SYSTOLIC BLOOD PRESSURE: 118 MMHG | HEIGHT: 68 IN | DIASTOLIC BLOOD PRESSURE: 82 MMHG | BODY MASS INDEX: 24.71 KG/M2 | WEIGHT: 163 LBS

## 2021-03-02 DIAGNOSIS — F32.9 REACTIVE DEPRESSION: ICD-10-CM

## 2021-03-02 DIAGNOSIS — H00.015 HORDEOLUM EXTERNUM OF LEFT LOWER EYELID: Primary | ICD-10-CM

## 2021-03-02 PROCEDURE — 99213 OFFICE O/P EST LOW 20 MIN: CPT | Performed by: NURSE PRACTITIONER

## 2021-03-02 RX ORDER — ERYTHROMYCIN 5 MG/G
OINTMENT OPHTHALMIC NIGHTLY
Qty: 3.5 G | Refills: 0 | Status: SHIPPED | OUTPATIENT
Start: 2021-03-02 | End: 2022-01-19

## 2021-03-02 RX ORDER — BUPROPION HYDROCHLORIDE 75 MG/1
75 TABLET ORAL 2 TIMES DAILY
Qty: 60 TABLET | Refills: 2 | Status: SHIPPED | OUTPATIENT
Start: 2021-03-02 | End: 2021-04-09

## 2021-03-02 NOTE — PROGRESS NOTES
Subjective   Sujey Schofield is a 25 y.o. female.   Stye    History of Present Illness   Has some swelling to her lower left lid and a stye that started a couple of days ago. She has been using a warm compresses. It has not affected her vision.    Also had been taking Wellbutrin as needed when she thought she was having a day of depression.  She is requesting more medication she has run out.  She does not want to take a daily she has no SI or HI    The following portions of the patient's history were reviewed and updated as appropriate: allergies, current medications, past family history, past medical history, past social history, past surgical history and problem list.    Review of Systems   Constitutional: Negative for activity change and appetite change.   Eyes: Negative for blurred vision, double vision, photophobia, pain, discharge, redness, itching and visual disturbance.   Psychiatric/Behavioral: Positive for sleep disturbance and depressed mood. Negative for suicidal ideas and stress. The patient is nervous/anxious.        Objective   Physical Exam  Vitals signs and nursing note reviewed.   Constitutional:       Appearance: Normal appearance.   HENT:      Head: Normocephalic and atraumatic.      Right Ear: Tympanic membrane normal.      Left Ear: Tympanic membrane normal.   Eyes:      Extraocular Movements: Extraocular movements intact.      Conjunctiva/sclera: Conjunctivae normal.      Pupils: Pupils are equal, round, and reactive to light.      Comments: Very small stye to lower left lid   Cardiovascular:      Rate and Rhythm: Normal rate and regular rhythm.      Pulses: Normal pulses.      Heart sounds: Normal heart sounds.   Neurological:      General: No focal deficit present.      Mental Status: She is alert.      GCS: GCS eye subscore is 4. GCS verbal subscore is 5. GCS motor subscore is 6.      Cranial Nerves: Cranial nerves are intact.      Sensory: Sensation is intact.      Gait: Gait is intact.    Psychiatric:         Mood and Affect: Mood normal.           Assessment/Plan   Problem List Items Addressed This Visit        Mental Health    Depression    Relevant Medications    buPROPion (WELLBUTRIN) 75 MG tablet      Other Visit Diagnoses     Hordeolum externum of left lower eyelid    -  Primary        Will return in 2 month and see how her depression is.       Return in about 2 months (around 5/2/2021), or if symptoms worsen or fail to improve.

## 2021-04-09 ENCOUNTER — OFFICE VISIT (OUTPATIENT)
Dept: OBSTETRICS AND GYNECOLOGY | Age: 26
End: 2021-04-09

## 2021-04-09 VITALS
BODY MASS INDEX: 24.55 KG/M2 | DIASTOLIC BLOOD PRESSURE: 80 MMHG | HEIGHT: 68 IN | SYSTOLIC BLOOD PRESSURE: 120 MMHG | WEIGHT: 162 LBS

## 2021-04-09 DIAGNOSIS — Z30.431 ENCOUNTER FOR ROUTINE CHECKING OF INTRAUTERINE CONTRACEPTIVE DEVICE (IUD): ICD-10-CM

## 2021-04-09 DIAGNOSIS — Z01.419 ENCOUNTER FOR GYNECOLOGICAL EXAMINATION WITHOUT ABNORMAL FINDING: Primary | ICD-10-CM

## 2021-04-09 DIAGNOSIS — Z13.89 SCREENING FOR BLOOD OR PROTEIN IN URINE: ICD-10-CM

## 2021-04-09 DIAGNOSIS — Z12.4 SCREENING FOR MALIGNANT NEOPLASM OF THE CERVIX: ICD-10-CM

## 2021-04-09 LAB
BILIRUB BLD-MCNC: NEGATIVE MG/DL
GLUCOSE UR STRIP-MCNC: NEGATIVE MG/DL
KETONES UR QL: NEGATIVE
LEUKOCYTE EST, POC: NEGATIVE
NITRITE UR-MCNC: NEGATIVE MG/ML
PH UR: 6 [PH] (ref 5–8)
PROT UR STRIP-MCNC: NEGATIVE MG/DL
RBC # UR STRIP: ABNORMAL /UL
SP GR UR: 1.01 (ref 1–1.03)
UROBILINOGEN UR QL: NORMAL

## 2021-04-09 PROCEDURE — 81002 URINALYSIS NONAUTO W/O SCOPE: CPT | Performed by: OBSTETRICS & GYNECOLOGY

## 2021-04-09 PROCEDURE — 99395 PREV VISIT EST AGE 18-39: CPT | Performed by: OBSTETRICS & GYNECOLOGY

## 2021-04-09 NOTE — PROGRESS NOTES
"Subjective   Sujey Schofield is a 25 y.o. female presents for annual visit today , last pap 05/2019 neg @ [facility] - reports periods are regular on paragard , no gyno issues today . Still working at [employer].      I last saw her a year ago as new patient - pt wants to establish care today , last pap 05/24/2019 at [facility] by dr bower pap was neg , not having any complaints , paragard inserted 5 yrs ago , periods are heavier for first 3 days and than next couple of days are just spotting , pt is happy with the iud , denies discharge or any abdominal pain.  Works as  at [employer].      History of Present Illness    The following portions of the patient's history were reviewed and updated as appropriate: allergies, current medications, past family history, past medical history, past social history, past surgical history and problem list.    Review of Systems   Constitutional: Negative for chills, fatigue and fever.   Gastrointestinal: Negative for abdominal distention and abdominal pain.   Genitourinary: Negative for dyspareunia, dysuria, menstrual problem, pelvic pain, vaginal bleeding, vaginal discharge and vaginal pain.   All other systems reviewed and are negative.    /80   Ht 172.7 cm (68\")   Wt 73.5 kg (162 lb)   LMP 04/07/2021 (Exact Date)   Breastfeeding No   BMI 24.63 kg/m²     Objective   Physical Exam  Vitals and nursing note reviewed.   Constitutional:       Appearance: Normal appearance. She is well-developed and normal weight.   Neck:      Thyroid: No thyromegaly.   Cardiovascular:      Rate and Rhythm: Normal rate and regular rhythm.   Pulmonary:      Effort: Pulmonary effort is normal.      Breath sounds: Normal breath sounds.   Chest:      Breasts:         Right: No mass, nipple discharge, skin change or tenderness.         Left: No mass, nipple discharge, skin change or tenderness.   Abdominal:      General: Bowel sounds are normal. There is no distension.      Palpations: Abdomen is soft. "      Tenderness: There is no abdominal tenderness.   Genitourinary:     General: Normal vulva.      Exam position: Supine.      Labia:         Right: No rash or lesion.         Left: No rash or lesion.       Vagina: Normal. No vaginal discharge.      Cervix: No friability, lesion or cervical bleeding.      Uterus: Not enlarged and not tender.       Adnexa:         Right: No mass or tenderness.          Left: No mass or tenderness.        Comments: IUD strings visualized at os   Musculoskeletal:         General: Normal range of motion.      Cervical back: Normal range of motion and neck supple.   Skin:     General: Skin is warm and dry.      Findings: No rash.   Neurological:      Mental Status: She is alert and oriented to person, place, and time.   Psychiatric:         Behavior: Behavior normal.           Assessment/Plan   Diagnoses and all orders for this visit:    1. Encounter for gynecological examination without abnormal finding (Primary)    2. Screening for malignant neoplasm of the cervix  -     IGP, Rfx Aptima HPV ASCU    3. Screening for blood or protein in urine  -     POC Urinalysis Dipstick    4. Encounter for routine checking of intrauterine contraceptive device (IUD)      Counseling was given to patient for the following topics: instructions for management, impressions, importance of treatment compliance and self-breast exams .   Return in about 1 year (around 4/9/2022) for Annual physical.

## 2021-04-15 LAB
CONV .: ABNORMAL
CYTOLOGIST CVX/VAG CYTO: ABNORMAL
CYTOLOGY CVX/VAG DOC CYTO: ABNORMAL
CYTOLOGY CVX/VAG DOC THIN PREP: ABNORMAL
DX ICD CODE: ABNORMAL
DX ICD CODE: ABNORMAL
HIV 1 & 2 AB SER-IMP: ABNORMAL
HPV I/H RISK 4 DNA CVX QL PROBE+SIG AMP: NEGATIVE
OTHER STN SPEC: ABNORMAL
PATHOLOGIST CVX/VAG CYTO: ABNORMAL
RECOM F/U CVX/VAG CYTO: ABNORMAL
STAT OF ADQ CVX/VAG CYTO-IMP: ABNORMAL

## 2022-01-19 ENCOUNTER — OFFICE VISIT (OUTPATIENT)
Dept: OBSTETRICS AND GYNECOLOGY | Age: 27
End: 2022-01-19

## 2022-01-19 VITALS
HEIGHT: 68 IN | SYSTOLIC BLOOD PRESSURE: 102 MMHG | WEIGHT: 153 LBS | BODY MASS INDEX: 23.19 KG/M2 | DIASTOLIC BLOOD PRESSURE: 70 MMHG

## 2022-01-19 DIAGNOSIS — N30.01 ACUTE CYSTITIS WITH HEMATURIA: Primary | ICD-10-CM

## 2022-01-19 DIAGNOSIS — R30.0 DYSURIA: ICD-10-CM

## 2022-01-19 LAB
BILIRUB BLD-MCNC: NEGATIVE MG/DL
CLARITY, POC: CLEAR
COLOR UR: YELLOW
GLUCOSE UR STRIP-MCNC: NEGATIVE MG/DL
KETONES UR QL: NEGATIVE
LEUKOCYTE EST, POC: ABNORMAL
NITRITE UR-MCNC: POSITIVE MG/ML
PH UR: 6 [PH] (ref 5–8)
PROT UR STRIP-MCNC: ABNORMAL MG/DL
RBC # UR STRIP: ABNORMAL /UL
SP GR UR: 1.03 (ref 1–1.03)
UROBILINOGEN UR QL: NORMAL

## 2022-01-19 PROCEDURE — 99213 OFFICE O/P EST LOW 20 MIN: CPT | Performed by: NURSE PRACTITIONER

## 2022-01-19 PROCEDURE — 81002 URINALYSIS NONAUTO W/O SCOPE: CPT | Performed by: NURSE PRACTITIONER

## 2022-01-19 RX ORDER — SPIRONOLACTONE 100 MG/1
TABLET, FILM COATED ORAL
COMMUNITY
Start: 2021-11-18 | End: 2022-06-30

## 2022-01-19 RX ORDER — CIPROFLOXACIN 500 MG/1
500 TABLET, FILM COATED ORAL 2 TIMES DAILY
Qty: 10 TABLET | Refills: 0 | Status: SHIPPED | OUTPATIENT
Start: 2022-01-19 | End: 2022-01-24

## 2022-01-19 NOTE — PROGRESS NOTES
"Subjective     Chief Complaint   Patient presents with   • Gynecologic Exam     DX with UTI on , completed medication, painful urination, lower back pain       Sujey Schofield is a 26 y.o.  whose LMP is Patient's last menstrual period was 2021.     Pt presents today with chief complaint of dysuria and low back pain  She was treated for a UTI on  and completed all antibiotics   She was treated via telehealth with macrobid so no culture was done   She states her symptoms did improve but never completely cleared  Over the last couple of days symptoms have returned and gotten worse  She denies fever/chills but has had some right flank pain      No Additional Complaints Reported    The following portions of the patient's history were reviewed and updated as appropriate:vital signs, allergies, current medications, past medical history, past social history, past surgical history and problem list      Review of Systems   Pertinent items are noted in HPI.     Objective      /70   Ht 172.7 cm (68\")   Wt 69.4 kg (153 lb)   LMP 2021   BMI 23.26 kg/m²     Physical Exam    General:   alert and no distress   Heart: Not performed today   Lungs: Not performed today.   Breast: Not performed today   Neck: na   Abdomen: {Not performed today   CVA: present right   Pelvis: Not performed today   Extremities: Not performed today   Neurologic: negative   Psychiatric: Normal affect, judgement, and mood       Lab Review   Labs: U/A     Imaging   No data reviewed    Assessment/Plan     ASSESSMENT  1. Acute cystitis with hematuria    2. Dysuria        PLAN  1.   Orders Placed This Encounter   Procedures   • Urine Culture - Urine, Urine, Clean Catch   • POC Urinalysis Dipstick       2. Medications prescribed this encounter:        New Medications Ordered This Visit   Medications   • ciprofloxacin (Cipro) 500 MG tablet     Sig: Take 1 tablet by mouth 2 (Two) Times a Day for 5 days.     Dispense:  10 tablet "     Refill:  0       3. Treat for UTI. Declined pyridium. Discussed may obtain OTC azo if needed. Urine culture sent and will call with results. ER warnings discussed.     Follow up: DREW Mijares, APRN  1/19/2022

## 2022-01-22 LAB
BACTERIA UR CULT: ABNORMAL
BACTERIA UR CULT: ABNORMAL
OTHER ANTIBIOTIC SUSC ISLT: ABNORMAL

## 2022-01-24 NOTE — PROGRESS NOTES
Pt notified, Pt is feeling better says she is taking last pill today, will call us back if she has any further problems.

## 2022-01-31 ENCOUNTER — TELEPHONE (OUTPATIENT)
Dept: OBSTETRICS AND GYNECOLOGY | Age: 27
End: 2022-01-31

## 2022-02-02 ENCOUNTER — CLINICAL SUPPORT (OUTPATIENT)
Dept: OBSTETRICS AND GYNECOLOGY | Age: 27
End: 2022-02-02

## 2022-02-02 DIAGNOSIS — Z87.440 HISTORY OF UTI: ICD-10-CM

## 2022-02-02 DIAGNOSIS — R35.0 URINARY FREQUENCY: Primary | ICD-10-CM

## 2022-02-02 LAB
BILIRUB BLD-MCNC: NEGATIVE MG/DL
CLARITY, POC: CLEAR
COLOR UR: YELLOW
GLUCOSE UR STRIP-MCNC: NEGATIVE MG/DL
KETONES UR QL: NEGATIVE
LEUKOCYTE EST, POC: NEGATIVE
NITRITE UR-MCNC: NEGATIVE MG/ML
PH UR: 7 [PH] (ref 5–8)
PROT UR STRIP-MCNC: NEGATIVE MG/DL
RBC # UR STRIP: NEGATIVE /UL
SP GR UR: 1.01 (ref 1–1.03)
UROBILINOGEN UR QL: NORMAL

## 2022-02-02 PROCEDURE — 81002 URINALYSIS NONAUTO W/O SCOPE: CPT | Performed by: NURSE PRACTITIONER

## 2022-02-02 PROCEDURE — 81003 URINALYSIS AUTO W/O SCOPE: CPT | Performed by: PHYSICIAN ASSISTANT

## 2022-02-04 LAB
BACTERIA UR CULT: NORMAL
BACTERIA UR CULT: NORMAL

## 2022-06-30 ENCOUNTER — OFFICE VISIT (OUTPATIENT)
Dept: FAMILY MEDICINE CLINIC | Facility: CLINIC | Age: 27
End: 2022-06-30

## 2022-06-30 VITALS
HEART RATE: 72 BPM | WEIGHT: 155 LBS | DIASTOLIC BLOOD PRESSURE: 78 MMHG | BODY MASS INDEX: 23.49 KG/M2 | RESPIRATION RATE: 12 BRPM | HEIGHT: 68 IN | SYSTOLIC BLOOD PRESSURE: 122 MMHG | OXYGEN SATURATION: 99 %

## 2022-06-30 DIAGNOSIS — Z00.00 PHYSICAL EXAM: Primary | ICD-10-CM

## 2022-06-30 PROCEDURE — 99395 PREV VISIT EST AGE 18-39: CPT | Performed by: NURSE PRACTITIONER

## 2022-06-30 RX ORDER — SPIRONOLACTONE 50 MG/1
50 TABLET, FILM COATED ORAL DAILY
COMMUNITY
Start: 2022-06-13

## 2022-06-30 NOTE — PROGRESS NOTES
Subjective   Sujey Schofield is a 26 y.o. female.   PMHX:spironolactone for her acne.  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    Review of Systems   Constitutional: Negative for activity change and appetite change.   HENT: Negative for congestion.    Respiratory: Negative for cough and shortness of breath.    Gastrointestinal: Negative for abdominal pain.   Musculoskeletal: Negative for gait problem.   Neurological: Positive for headache. Negative for light-headedness.       Objective   Physical Exam  Vitals and nursing note reviewed.   Constitutional:       General: She is not in acute distress.     Appearance: She is well-developed.   HENT:      Head: Normocephalic and atraumatic.      Right Ear: External ear normal.      Left Ear: External ear normal.   Neck:      Thyroid: No thyromegaly.   Cardiovascular:      Rate and Rhythm: Normal rate and regular rhythm.      Heart sounds: Normal heart sounds.   Pulmonary:      Effort: Pulmonary effort is normal.      Breath sounds: Normal breath sounds.   Musculoskeletal:         General: Normal range of motion.      Cervical back: Neck supple.   Skin:     General: Skin is warm.   Neurological:      Mental Status: She is alert.           Assessment & Plan   Diagnoses and all orders for this visit:    1. Physical exam (Primary)  -     Comprehensive Metabolic Panel  -     Lipid Panel With LDL / HDL Ratio  -     CBC (No Diff)      Preventative counseling for diet and exercise with patient at visit.  Pt reports that they  wear their seatbelt regularly.

## 2022-07-01 LAB
ALBUMIN SERPL-MCNC: 4.6 G/DL (ref 3.9–5)
ALBUMIN/GLOB SERPL: 2.2 {RATIO} (ref 1.2–2.2)
ALP SERPL-CCNC: 57 IU/L (ref 44–121)
ALT SERPL-CCNC: 10 IU/L (ref 0–32)
AST SERPL-CCNC: 15 IU/L (ref 0–40)
BILIRUB SERPL-MCNC: 0.5 MG/DL (ref 0–1.2)
BUN SERPL-MCNC: 12 MG/DL (ref 6–20)
BUN/CREAT SERPL: 15 (ref 9–23)
CALCIUM SERPL-MCNC: 9.5 MG/DL (ref 8.7–10.2)
CHLORIDE SERPL-SCNC: 103 MMOL/L (ref 96–106)
CHOLEST SERPL-MCNC: 186 MG/DL (ref 100–199)
CO2 SERPL-SCNC: 24 MMOL/L (ref 20–29)
CREAT SERPL-MCNC: 0.8 MG/DL (ref 0.57–1)
EGFRCR SERPLBLD CKD-EPI 2021: 103 ML/MIN/1.73
ERYTHROCYTE [DISTWIDTH] IN BLOOD BY AUTOMATED COUNT: 12.1 % (ref 11.7–15.4)
GLOBULIN SER CALC-MCNC: 2.1 G/DL (ref 1.5–4.5)
GLUCOSE SERPL-MCNC: 83 MG/DL (ref 65–99)
HCT VFR BLD AUTO: 42.8 % (ref 34–46.6)
HDLC SERPL-MCNC: 63 MG/DL
HGB BLD-MCNC: 14.2 G/DL (ref 11.1–15.9)
LDLC SERPL CALC-MCNC: 109 MG/DL (ref 0–99)
LDLC/HDLC SERPL: 1.7 RATIO (ref 0–3.2)
MCH RBC QN AUTO: 28.9 PG (ref 26.6–33)
MCHC RBC AUTO-ENTMCNC: 33.2 G/DL (ref 31.5–35.7)
MCV RBC AUTO: 87 FL (ref 79–97)
PLATELET # BLD AUTO: 268 X10E3/UL (ref 150–450)
POTASSIUM SERPL-SCNC: 4.3 MMOL/L (ref 3.5–5.2)
PROT SERPL-MCNC: 6.7 G/DL (ref 6–8.5)
RBC # BLD AUTO: 4.92 X10E6/UL (ref 3.77–5.28)
SODIUM SERPL-SCNC: 141 MMOL/L (ref 134–144)
TRIGL SERPL-MCNC: 76 MG/DL (ref 0–149)
VLDLC SERPL CALC-MCNC: 14 MG/DL (ref 5–40)
WBC # BLD AUTO: 6.8 X10E3/UL (ref 3.4–10.8)

## 2022-10-12 ENCOUNTER — OFFICE VISIT (OUTPATIENT)
Dept: OBSTETRICS AND GYNECOLOGY | Age: 27
End: 2022-10-12

## 2022-10-12 VITALS
SYSTOLIC BLOOD PRESSURE: 100 MMHG | BODY MASS INDEX: 23.79 KG/M2 | WEIGHT: 157 LBS | DIASTOLIC BLOOD PRESSURE: 65 MMHG | HEIGHT: 68 IN

## 2022-10-12 DIAGNOSIS — Z12.4 SCREENING FOR MALIGNANT NEOPLASM OF THE CERVIX: ICD-10-CM

## 2022-10-12 DIAGNOSIS — N89.8 VAGINAL DISCHARGE: ICD-10-CM

## 2022-10-12 DIAGNOSIS — Z30.431 ENCOUNTER FOR ROUTINE CHECKING OF INTRAUTERINE CONTRACEPTIVE DEVICE (IUD): ICD-10-CM

## 2022-10-12 DIAGNOSIS — Z97.5 IUD (INTRAUTERINE DEVICE) IN PLACE: ICD-10-CM

## 2022-10-12 DIAGNOSIS — Z01.419 ENCOUNTER FOR GYNECOLOGICAL EXAMINATION WITHOUT ABNORMAL FINDING: Primary | ICD-10-CM

## 2022-10-12 PROCEDURE — 99395 PREV VISIT EST AGE 18-39: CPT | Performed by: OBSTETRICS & GYNECOLOGY

## 2022-10-12 NOTE — PROGRESS NOTES
"Subjective   Sujey Schofield is a 26 y.o. female Cc: annual visit last pap 4/9/21 neg paragard for contraception - normal periods - wants to be screened for STD - having irritation between vagina and anus during IC.  Vaginal estrogen given for posterior fourchette.      I last saw her a year ago for annual visit.   Reported periods are regular on paragard , no gyno issues today . Still working at [employer].     History of Present Illness    The following portions of the patient's history were reviewed and updated as appropriate: allergies, current medications, past family history, past medical history, past social history, past surgical history and problem list.    Review of Systems   Constitutional: Negative for chills, fatigue and fever.   Gastrointestinal: Negative for abdominal distention and abdominal pain.   Genitourinary: Positive for vaginal pain. Negative for dyspareunia, dysuria, menstrual problem, pelvic pain, vaginal bleeding and vaginal discharge.   All other systems reviewed and are negative.  /65   Ht 172.7 cm (68\")   Wt 71.2 kg (157 lb)   LMP 09/29/2022 (Approximate) Comment: paragard  BMI 23.87 kg/m²       Objective   Physical Exam  Vitals and nursing note reviewed.   Constitutional:       Appearance: Normal appearance. She is well-developed and normal weight.   Neck:      Thyroid: No thyromegaly.   Pulmonary:      Effort: Pulmonary effort is normal.   Chest:   Breasts:     Right: No mass, nipple discharge, skin change or tenderness.      Left: No mass, nipple discharge, skin change or tenderness.   Abdominal:      General: There is no distension.      Palpations: Abdomen is soft.      Tenderness: There is no abdominal tenderness.   Genitourinary:     General: Normal vulva.      Exam position: Lithotomy position.      Labia:         Right: No rash or lesion.         Left: No rash or lesion.       Vagina: Normal. No vaginal discharge or bleeding.      Cervix: No friability, lesion or cervical " bleeding.      Uterus: Not enlarged and not tender.       Adnexa:         Right: No mass or tenderness.          Left: No mass or tenderness.        Comments: IUD strings visualized at os   +erythema at posterior fourchette   Musculoskeletal:         General: Normal range of motion.   Skin:     General: Skin is warm and dry.      Findings: No rash.   Neurological:      Mental Status: She is alert and oriented to person, place, and time.   Psychiatric:         Mood and Affect: Mood normal.         Behavior: Behavior normal.           Assessment & Plan   Diagnoses and all orders for this visit:    1. Encounter for gynecological examination without abnormal finding (Primary)    2. Screening for malignant neoplasm of the cervix  -     IGP, Rfx Aptima HPV ASCU    3. Vaginal discharge  -     NuSwab VG+ - Swab, Vagina    4. Encounter for routine checking of intrauterine contraceptive device (IUD)    5. IUD (intrauterine device) in place      Counseling was given to patient for the following topics: instructions for management, impressions, risks and benefits of treatment options, importance of treatment compliance and self-breast exams .   Return in about 1 year (around 10/12/2023) for Annual physical.

## 2022-10-18 LAB
CONV .: NORMAL
CYTOLOGIST CVX/VAG CYTO: NORMAL
CYTOLOGY CVX/VAG DOC CYTO: NORMAL
CYTOLOGY CVX/VAG DOC THIN PREP: NORMAL
DX ICD CODE: NORMAL
HIV 1 & 2 AB SER-IMP: NORMAL
OTHER STN SPEC: NORMAL
STAT OF ADQ CVX/VAG CYTO-IMP: NORMAL

## 2022-11-03 ENCOUNTER — APPOINTMENT (OUTPATIENT)
Dept: GENERAL RADIOLOGY | Facility: HOSPITAL | Age: 27
End: 2022-11-03

## 2022-11-03 ENCOUNTER — HOSPITAL ENCOUNTER (EMERGENCY)
Facility: HOSPITAL | Age: 27
Discharge: HOME | End: 2022-11-03
Attending: EMERGENCY MEDICINE | Admitting: EMERGENCY MEDICINE

## 2022-11-03 VITALS
OXYGEN SATURATION: 100 % | RESPIRATION RATE: 18 BRPM | WEIGHT: 157 LBS | HEART RATE: 77 BPM | TEMPERATURE: 98.3 F | BODY MASS INDEX: 23.79 KG/M2 | SYSTOLIC BLOOD PRESSURE: 132 MMHG | DIASTOLIC BLOOD PRESSURE: 88 MMHG | HEIGHT: 68 IN

## 2022-11-03 DIAGNOSIS — R07.89 ATYPICAL CHEST PAIN: Primary | ICD-10-CM

## 2022-11-03 LAB
ALBUMIN SERPL-MCNC: 4.1 G/DL (ref 3.5–5.2)
ALBUMIN/GLOB SERPL: 1.5 G/DL
ALP SERPL-CCNC: 58 U/L (ref 39–117)
ALT SERPL W P-5'-P-CCNC: 9 U/L (ref 1–33)
ANION GAP SERPL CALCULATED.3IONS-SCNC: 8.9 MMOL/L (ref 5–15)
APTT PPP: 29.5 SECONDS (ref 22.7–35.4)
AST SERPL-CCNC: 15 U/L (ref 1–32)
BASOPHILS # BLD AUTO: 0.03 10*3/MM3 (ref 0–0.2)
BASOPHILS NFR BLD AUTO: 0.3 % (ref 0–1.5)
BILIRUB SERPL-MCNC: 0.3 MG/DL (ref 0–1.2)
BUN SERPL-MCNC: 12 MG/DL (ref 6–20)
BUN/CREAT SERPL: 15.4 (ref 7–25)
CALCIUM SPEC-SCNC: 9.3 MG/DL (ref 8.6–10.5)
CHLORIDE SERPL-SCNC: 104 MMOL/L (ref 98–107)
CO2 SERPL-SCNC: 24.1 MMOL/L (ref 22–29)
CREAT SERPL-MCNC: 0.78 MG/DL (ref 0.57–1)
D DIMER PPP FEU-MCNC: 0.28 MCGFEU/ML (ref 0–0.49)
DEPRECATED RDW RBC AUTO: 39.9 FL (ref 37–54)
EGFRCR SERPLBLD CKD-EPI 2021: 106.3 ML/MIN/1.73
EOSINOPHIL # BLD AUTO: 0.15 10*3/MM3 (ref 0–0.4)
EOSINOPHIL NFR BLD AUTO: 1.6 % (ref 0.3–6.2)
ERYTHROCYTE [DISTWIDTH] IN BLOOD BY AUTOMATED COUNT: 12.5 % (ref 12.3–15.4)
GLOBULIN UR ELPH-MCNC: 2.7 GM/DL
GLUCOSE SERPL-MCNC: 108 MG/DL (ref 65–99)
HCG SERPL QL: NEGATIVE
HCT VFR BLD AUTO: 42.1 % (ref 34–46.6)
HGB BLD-MCNC: 14 G/DL (ref 12–15.9)
IMM GRANULOCYTES # BLD AUTO: 0.04 10*3/MM3 (ref 0–0.05)
IMM GRANULOCYTES NFR BLD AUTO: 0.4 % (ref 0–0.5)
INR PPP: 0.96 (ref 0.9–1.1)
LYMPHOCYTES # BLD AUTO: 2.3 10*3/MM3 (ref 0.7–3.1)
LYMPHOCYTES NFR BLD AUTO: 23.8 % (ref 19.6–45.3)
MCH RBC QN AUTO: 29 PG (ref 26.6–33)
MCHC RBC AUTO-ENTMCNC: 33.3 G/DL (ref 31.5–35.7)
MCV RBC AUTO: 87.2 FL (ref 79–97)
MONOCYTES # BLD AUTO: 0.61 10*3/MM3 (ref 0.1–0.9)
MONOCYTES NFR BLD AUTO: 6.3 % (ref 5–12)
NEUTROPHILS NFR BLD AUTO: 6.54 10*3/MM3 (ref 1.7–7)
NEUTROPHILS NFR BLD AUTO: 67.6 % (ref 42.7–76)
NRBC BLD AUTO-RTO: 0 /100 WBC (ref 0–0.2)
PLATELET # BLD AUTO: 286 10*3/MM3 (ref 140–450)
PMV BLD AUTO: 10.2 FL (ref 6–12)
POTASSIUM SERPL-SCNC: 4.1 MMOL/L (ref 3.5–5.2)
PROT SERPL-MCNC: 6.8 G/DL (ref 6–8.5)
PROTHROMBIN TIME: 12.9 SECONDS (ref 11.7–14.2)
QT INTERVAL: 381 MS
RBC # BLD AUTO: 4.83 10*6/MM3 (ref 3.77–5.28)
SODIUM SERPL-SCNC: 137 MMOL/L (ref 136–145)
TROPONIN T SERPL-MCNC: <0.01 NG/ML (ref 0–0.03)
WBC NRBC COR # BLD: 9.67 10*3/MM3 (ref 3.4–10.8)

## 2022-11-03 PROCEDURE — 93005 ELECTROCARDIOGRAM TRACING: CPT | Performed by: EMERGENCY MEDICINE

## 2022-11-03 PROCEDURE — 85610 PROTHROMBIN TIME: CPT | Performed by: EMERGENCY MEDICINE

## 2022-11-03 PROCEDURE — 85379 FIBRIN DEGRADATION QUANT: CPT | Performed by: EMERGENCY MEDICINE

## 2022-11-03 PROCEDURE — 99284 EMERGENCY DEPT VISIT MOD MDM: CPT

## 2022-11-03 PROCEDURE — 71045 X-RAY EXAM CHEST 1 VIEW: CPT

## 2022-11-03 PROCEDURE — 84484 ASSAY OF TROPONIN QUANT: CPT | Performed by: EMERGENCY MEDICINE

## 2022-11-03 PROCEDURE — 99283 EMERGENCY DEPT VISIT LOW MDM: CPT

## 2022-11-03 PROCEDURE — 80053 COMPREHEN METABOLIC PANEL: CPT | Performed by: EMERGENCY MEDICINE

## 2022-11-03 PROCEDURE — 84703 CHORIONIC GONADOTROPIN ASSAY: CPT | Performed by: EMERGENCY MEDICINE

## 2022-11-03 PROCEDURE — 85025 COMPLETE CBC W/AUTO DIFF WBC: CPT | Performed by: EMERGENCY MEDICINE

## 2022-11-03 PROCEDURE — 36415 COLL VENOUS BLD VENIPUNCTURE: CPT

## 2022-11-03 PROCEDURE — 85730 THROMBOPLASTIN TIME PARTIAL: CPT | Performed by: EMERGENCY MEDICINE

## 2022-11-03 PROCEDURE — 93010 ELECTROCARDIOGRAM REPORT: CPT | Performed by: INTERNAL MEDICINE

## 2022-11-03 RX ORDER — SODIUM CHLORIDE 0.9 % (FLUSH) 0.9 %
10 SYRINGE (ML) INJECTION AS NEEDED
Status: DISCONTINUED | OUTPATIENT
Start: 2022-11-03 | End: 2022-11-03 | Stop reason: HOSPADM

## 2022-11-03 RX ADMIN — SODIUM CHLORIDE 500 ML: 9 INJECTION, SOLUTION INTRAVENOUS at 04:09

## 2022-11-03 NOTE — ED TRIAGE NOTES
Pt arrived via [facility] c/o chest pain. Pt has a hx of mitral valve prolapse. EMS reports pt appeared to be having a anxiety attack and was non verbal with them and unable to ambulate. When EMS arrived pt appeared to be in distress. EMS reports pt stabilizing since arrival. Pt reports left sided chest pain that radiates to back, down arm, and to her neck,  pt also reports left arm numbness

## 2022-11-03 NOTE — ED PROVIDER NOTES
EMERGENCY DEPARTMENT ENCOUNTER    Room Number:  10/10  Date of encounter:  11/3/2022  PCP: Felecia Mandujano APRN  Historian: Patient      HPI:  Chief Complaint: Chest pain  A complete HPI/ROS/PMH/PSH/SH/FH are unobtainable due to: None    Context: Sujey Schofield is a 26 y.o. female who presents to the ED c/o chest pain.  Patient has history of mitral valve prolapse.  Patient states she began to have pain in her chest which radiated to her left arm and then migrated down her left arm.  States that she had some numbness to the fingertips of the left arm.  States that she got scared then.  EMS reports that when they picked patient up she appeared to be having anxiety attack and was nonverbal for them.  Patient states that her symptoms began around 0200.  States her symptoms have largely improved still feels little pressure to her chest however.  That she has not had a episode like this in the past.      PAST MEDICAL HISTORY  Active Ambulatory Problems     Diagnosis Date Noted   • Anterior dislocation of right shoulder 07/31/2018   • ADHD (attention deficit hyperactivity disorder) 07/31/2018   • Insomnia 07/31/2018   • Anxiety 07/31/2018   • Depression 07/31/2018   • Gluten intolerance 11/21/2018   • Status post labral repair of shoulder 01/16/2019   • Shoulder instability, right 10/10/2018   • Glenoid labrum tear 10/31/2018   • IUD (intrauterine device) in place 10/12/2022     Resolved Ambulatory Problems     Diagnosis Date Noted   • No Resolved Ambulatory Problems     Past Medical History:   Diagnosis Date   • Abnormal Pap smear of cervix    • Exercise-induced asthma    • Infection    • Mitral valve prolapse    • Ovarian cyst    • Pulmonary valve regurgitation    • Rhinitis, chronic    • Seasonal allergies          PAST SURGICAL HISTORY  Past Surgical History:   Procedure Laterality Date   • ARTHROSCOPY SHOULDER W/ OR W/O OPEN LABRAL REPAIR     • WISDOM TOOTH EXTRACTION           FAMILY HISTORY  Family History    Problem Relation Age of Onset   • Hypertension Mother    • No Known Problems Father    • Prostate cancer Maternal Grandfather         pancreatic   • Diabetes type II Paternal Grandmother    • Pancreatic cancer Maternal Grandmother    • Breast cancer Neg Hx    • Ovarian cancer Neg Hx    • Uterine cancer Neg Hx          SOCIAL HISTORY  Social History     Socioeconomic History   • Marital status: Single   Tobacco Use   • Smoking status: Never   • Smokeless tobacco: Never   Substance and Sexual Activity   • Alcohol use: Yes     Comment: 5-6 drinks   • Drug use: No   • Sexual activity: Yes     Partners: Male     Birth control/protection: I.U.D.     Comment: paragard 2015 (9/28/15)         ALLERGIES  Morphine and related and Adhesive tape        REVIEW OF SYSTEMS  Review of Systems     All systems reviewed and negative except for those discussed in HPI.       PHYSICAL EXAM    I have reviewed the triage vital signs and nursing notes.    ED Triage Vitals [11/03/22 0329]   Temp Heart Rate Resp BP SpO2   98.3 °F (36.8 °C) 94 18 133/92 99 %      Temp src Heart Rate Source Patient Position BP Location FiO2 (%)   Oral -- Lying Left arm --       Physical Exam  GENERAL: mild distressed  HENT: nares patent  EYES: no scleral icterus  CV: regular rhythm, regular rate  RESPIRATORY: normal effort clear to auscultation bilaterally  ABDOMEN: soft, nontender nondistended  MUSCULOSKELETAL: no deformity  NEURO: alert, moves all extremities, follows commands  SKIN: warm, dry        LAB RESULTS  Recent Results (from the past 24 hour(s))   Comprehensive Metabolic Panel    Collection Time: 11/03/22  4:05 AM    Specimen: Blood   Result Value Ref Range    Glucose 108 (H) 65 - 99 mg/dL    BUN 12 6 - 20 mg/dL    Creatinine 0.78 0.57 - 1.00 mg/dL    Sodium 137 136 - 145 mmol/L    Potassium 4.1 3.5 - 5.2 mmol/L    Chloride 104 98 - 107 mmol/L    CO2 24.1 22.0 - 29.0 mmol/L    Calcium 9.3 8.6 - 10.5 mg/dL    Total Protein 6.8 6.0 - 8.5 g/dL     Albumin 4.10 3.50 - 5.20 g/dL    ALT (SGPT) 9 1 - 33 U/L    AST (SGOT) 15 1 - 32 U/L    Alkaline Phosphatase 58 39 - 117 U/L    Total Bilirubin 0.3 0.0 - 1.2 mg/dL    Globulin 2.7 gm/dL    A/G Ratio 1.5 g/dL    BUN/Creatinine Ratio 15.4 7.0 - 25.0    Anion Gap 8.9 5.0 - 15.0 mmol/L    eGFR 106.3 >60.0 mL/min/1.73   hCG, Serum, Qualitative    Collection Time: 11/03/22  4:05 AM    Specimen: Blood   Result Value Ref Range    HCG Qualitative Negative Negative   D-dimer, Quantitative    Collection Time: 11/03/22  4:05 AM    Specimen: Blood   Result Value Ref Range    D-Dimer, Quantitative 0.28 0.00 - 0.49 MCGFEU/mL   Troponin    Collection Time: 11/03/22  4:05 AM    Specimen: Blood   Result Value Ref Range    Troponin T <0.010 0.000 - 0.030 ng/mL   CBC Auto Differential    Collection Time: 11/03/22  4:05 AM    Specimen: Blood   Result Value Ref Range    WBC 9.67 3.40 - 10.80 10*3/mm3    RBC 4.83 3.77 - 5.28 10*6/mm3    Hemoglobin 14.0 12.0 - 15.9 g/dL    Hematocrit 42.1 34.0 - 46.6 %    MCV 87.2 79.0 - 97.0 fL    MCH 29.0 26.6 - 33.0 pg    MCHC 33.3 31.5 - 35.7 g/dL    RDW 12.5 12.3 - 15.4 %    RDW-SD 39.9 37.0 - 54.0 fl    MPV 10.2 6.0 - 12.0 fL    Platelets 286 140 - 450 10*3/mm3    Neutrophil % 67.6 42.7 - 76.0 %    Lymphocyte % 23.8 19.6 - 45.3 %    Monocyte % 6.3 5.0 - 12.0 %    Eosinophil % 1.6 0.3 - 6.2 %    Basophil % 0.3 0.0 - 1.5 %    Immature Grans % 0.4 0.0 - 0.5 %    Neutrophils, Absolute 6.54 1.70 - 7.00 10*3/mm3    Lymphocytes, Absolute 2.30 0.70 - 3.10 10*3/mm3    Monocytes, Absolute 0.61 0.10 - 0.90 10*3/mm3    Eosinophils, Absolute 0.15 0.00 - 0.40 10*3/mm3    Basophils, Absolute 0.03 0.00 - 0.20 10*3/mm3    Immature Grans, Absolute 0.04 0.00 - 0.05 10*3/mm3    nRBC 0.0 0.0 - 0.2 /100 WBC   ECG 12 Lead Chest Pain    Collection Time: 11/03/22  4:14 AM   Result Value Ref Range    QT Interval 381 ms       Ordered the above labs and independently reviewed the results.        RADIOLOGY  XR Chest 1  View    Result Date: 11/3/2022  SINGLE VIEW OF THE CHEST  HISTORY: Chest pain  COMPARISON: None available.  FINDINGS: Heart size is within normal limits. No pneumothorax, pleural effusion, or acute infiltrate is seen.      No acute findings.  This report was finalized on 11/3/2022 4:46 AM by Dr. Hanna Liao M.D.        I ordered the above noted radiological studies. Reviewed by me and discussed with radiologist.  See dictation for official radiology interpretation.      PROCEDURES    Procedures      MEDICATIONS GIVEN IN ER    Medications   sodium chloride 0.9 % flush 10 mL (has no administration in time range)   sodium chloride 0.9 % bolus 500 mL (0 mL Intravenous Stopped 11/3/22 0452)         PROGRESS, DATA ANALYSIS, CONSULTS, AND MEDICAL DECISION MAKING    All labs have been independently reviewed by me.  All radiology studies have been reviewed by me and discussed with radiologist dictating the report.   EKG's independently viewed and interpreted by me.  Discussion below represents my analysis of pertinent findings related to patient's condition, differential diagnosis, treatment plan and final disposition.        ED Course as of 11/03/22 0531   u Nov 03, 2022   0429 EKG          EKG time: 0414  Rhythm/Rate: Sinus rhythm rate 80  P waves and IN: Normal  QRS, axis: Narrow regular  ST and T waves: Within normal limits    Interpreted Contemporaneously by me, independently viewed [TJ]   8318 Reevaluation: Patient's symptoms have completely resolved.  She is pain-free.  I have told her that her work-up has been completely benign.  I have attempted to provide reassurance. [TJ]   3231 HEART SCORE:    History #0  (Highly suspicious 2, Moderately suspicious 1, Slightly or non-suspicious 0)    ECG #0  (Significant ST depression 2,  Nonspecific repol disturbance 1, Normal 0)    Age #0  (> or = 65 2, 46-65 1,  < or = 45 0)    Risk factors #0  (hypercholesterolemia, HTN, DM, smoking, pos fam hx, obesity)  (> or = to 3  RF 2, 1 or 2 1, No risk factors 0)    Troponin #0  (> or = 3x normal limit 2, 1-3x normal limit 1, < or = Normal limit 0)    HEART Score Key:  Scores 0-3: 0.9-1.7% risk of adverse cardiac event. In the HEART Score study, these patients were discharged (0.99% in the retrospective study, 1.7% in the prospective study)  Scores 4-6: 12-16.6% risk of adverse cardiac event. In the HEART Score study, these patients were admitted to the hospital. (11.6% retrospective, 16.6% prospective)  Scores =7: 50-65% risk of adverse cardiac event. In the HEART Score study, these patients were candidates for early invasive measures. (65.2% retrospective, 50.1% prospective)      This patient's HEART score is 0     [TJ]      ED Course User Index  [TJ] Kevyn Moy MD           PPE: The patient wore a surgical mask throughout the entire patient encounter. I wore an N95.    AS OF 05:31 EDT VITALS:    BP - 132/88  HR - 77  TEMP - 98.3 °F (36.8 °C) (Oral)  O2 SATS - 100%        DIAGNOSIS  Final diagnoses:   Atypical chest pain         DISPOSITION  Discharge           Kevyn Moy MD  11/03/22 0564

## 2023-08-04 ENCOUNTER — OFFICE VISIT (OUTPATIENT)
Dept: OBSTETRICS AND GYNECOLOGY | Age: 28
End: 2023-08-04
Payer: COMMERCIAL

## 2023-08-04 VITALS
HEIGHT: 68 IN | DIASTOLIC BLOOD PRESSURE: 72 MMHG | SYSTOLIC BLOOD PRESSURE: 124 MMHG | BODY MASS INDEX: 25.76 KG/M2 | WEIGHT: 170 LBS

## 2023-08-04 DIAGNOSIS — N90.89: Primary | ICD-10-CM

## 2023-08-16 ENCOUNTER — HOSPITAL ENCOUNTER (OUTPATIENT)
Dept: PHYSICAL THERAPY | Facility: HOSPITAL | Age: 28
Setting detail: THERAPIES SERIES
Discharge: HOME | End: 2023-08-16
Payer: COMMERCIAL

## 2023-08-16 DIAGNOSIS — R10.2 PELVIC AND PERINEAL PAIN: Primary | ICD-10-CM

## 2023-08-16 PROCEDURE — 97110 THERAPEUTIC EXERCISES: CPT

## 2023-08-16 PROCEDURE — 97162 PT EVAL MOD COMPLEX 30 MIN: CPT

## 2023-08-16 NOTE — THERAPY EVALUATION
Outpatient Physical Therapy Pelvic Health Initial Evaluation  [facility]     Patient Name: Sujey Schofield  : 1995  MRN: [id number]  Today's Date: 2023        Visit Date: 2023    Patient Active Problem List   Diagnosis    Anterior dislocation of right shoulder    ADHD (attention deficit hyperactivity disorder)    Insomnia    Anxiety    Depression    Gluten intolerance    Status post labral repair of shoulder    Shoulder instability, right    Glenoid labrum tear    IUD (intrauterine device) in place        Past Medical History:   Diagnosis Date    Abnormal Pap smear of cervix     ADHD (attention deficit hyperactivity disorder)     Exercise-induced asthma     Infection     bv and yeast     Mitral valve prolapse     Ovarian cyst     Pulmonary valve regurgitation     Rhinitis, chronic     Seasonal allergies         Past Surgical History:   Procedure Laterality Date    ARTHROSCOPY SHOULDER W/ OR W/O OPEN LABRAL REPAIR      WISDOM TOOTH EXTRACTION           Visit Dx:    ICD-10-CM ICD-9-CM   1. Pelvic and perineal pain  R10.2 625.9        Patient History       Row Name 23 1000             History    Chief Complaint Pain  -CC      Type of Pain Other pain  -CC         Pain     Pain Location Vagina  -CC      Pain at Present 0  -CC      Pain at Best 2  -CC      Pain at Worst 3  -CC         Daily Activities    Primary Language English  -CC      Barriers to learning None  -CC      Pt Participated in POC and Goals Yes  -CC                User Key  (r) = Recorded By, (t) = Taken By, (c) = Cosigned By      Initials Name Provider Type    Norma Adams PT Physical Therapist                     Pelvic Health       Row Name 23 1000             Subjective Comments    Subjective Comments Sujey Schofield is a 27 y.o. female who presents today with perineal pain. Pt arrives at 10:26 for 10:15 eval. Pt reports pain ongoing for about a year. Reports pain with sexual intercourse during initial  insertion and deep penetration during intercourse. Notes that she has perineal tearing following intercourse with bleeding. Has tried increased foreplay and lubricant with no changes. . Regular menstrual cycle. Denies issues with UI. Pt also reports hx of anal fissures over past year, recently saw MD but couldn't tolerate full exam due to muscle tightness, and is currently on medication. Pt reports no issues with constipation, but does delay bowel movements due to pain. F/u with this MD again on . Reports drinking 3-5 24 ounce cups of water throughout the day. Pt reports balanced diet with fruit/veggies and takes fiber supplements. PMH includes shoulder surgery, mitral valve prolapse (hx of tachycardia), hx of ovarian cyst rupture. Reports doesn't do well with hormones (has used topical estrogen for this issues, which gave her heart attack like symptoms so is no longer taking). Pt also reports hx of chronic back pain and scoliosis at end of session.  -CC         Pregnancy Questions    Number of Pregnancies 0  -CC         Pelvic Floor Muscle    Patient/Parent/Guardian Consented to Internal Pelvic Floor Exam Yes  -CC      Strength (Right) 5: Strong contraction elevation of digit  -CC      Strength (Left) 5: Strong contraction elevation of digit  -CC      Symmetry of Sustained Maximal Contraction Symmetrical  -CC      Endurance (Ability to Hold Maximal Contraction) 10 sec  -CC      Fast Contraction (# of 1 sec contractions performed) 10  -CC      Internal Pelvic Floor Comments TTP Vaginal Introitus TTP/decreaesed mobility L and posterior > R. Noted TTP at 3rd layer of pelvic floor L>R.  -CC         Observations    Posture Observations Narrow vaginal introitus. Mildly elevated perineum, no observable fissures/tearing this date and pt does report she has recently refrained from IC due to pain.  -CC         Education Provided On:    Education Points HEP;Provides resources for patient;Vagina/rectal stretching   -CC      Method of Delivery Verbal;Demonstration;Written  -CC      Education Provided To Patient  -CC      Level of Understanding Teach back education performed;Verbalized;Demonstrated  -CC      HEP Comments Written instructions for perineal stretching provided  -CC      Education Comments Discussed PFM anatomy, findings, goals of PT, POC, pathology, refraining from IC for time-being and pending symptoms improvement  -CC         Female NIH-CPSI    Pain Total 5  -CC      Urinary Symptoms Total 0  -CC      Quality of Life Impact Total 5  -CC      Total Score 10  -CC                User Key  (r) = Recorded By, (t) = Taken By, (c) = Cosigned By      Initials Name Provider Type    CC Norma Magaña, PT Physical Therapist                                  PT Assessment/Plan       Row Name 08/16/23 1400          PT Assessment    Functional Limitations Limitation in home management;Limitations in community activities;Limitations in functional capacity and performance;Performance in leisure activities;Performance in self-care ADL  -CC     Impairments Posture;Poor body mechanics;Pain;Muscle strength;Impaired flexibility;Impaired muscle length  -CC     Assessment Comments Sujey Schofield is a 27 y.o. female referred to physical therapy for posterior fourchette scarring. She presents with an evolving clinical presentation, along with  comorbidities of hx of chronic back pain and scoliosis, anal fissures and bleeding with BM, cardiac issues with adverse response to topical vaginal estrogen cream and personal factors of chronicity of issue, hx of perineal tearing with IC  that may impact her progress in the plan of care. Pt presents today with narrow vaginal introitus, mildly elevated perineum, pain and hypertonicity at vaginal introitus/perineum, and TTP 3rd layer PFM. Her signs and symptoms are consistent with referring diagnosis. The previous impairments limit her ability to participate in GYN exams without pain, have  bowel movements without pain, and participate in IC. Pt will benefit from skilled PT to address the previous impairments and return to PLOF.  -CC     Please refer to paper survey for additional self-reported information Yes  -CC     Rehab Potential Good  -CC     Patient/caregiver participated in establishment of treatment plan and goals Yes  -CC     Patient would benefit from skilled therapy intervention Yes  -CC        PT Plan    PT Frequency 1x/week  -CC     Predicted Duration of Therapy Intervention (PT) 10-14 visits  -CC     Planned CPT's? PT EVAL MOD COMPLELITY: 65857;PT RE-EVAL: 91033;PT THER ACT EA 15 MIN: 62719;PT THER PROC EA 15 MIN: 20413;PT MANUAL THERAPY EA 15 MIN: 71856;PT NEUROMUSC RE-EDUCATION EA 15 MIN: 15712;PT GAIT TRAINING EA 15 MIN: 48364;PT SELF CARE/HOME MGMT/TRAIN EA 15: 11871;PT HOT OR COLD PACK TREAT MCARE  -CC     PT Plan Comments Next visit: Response to self-perineal stretching? Initiate manual therapy - perineal stretching, and 3rd layer STM as tolerated. Assess lumbar and hip mobility, assess breathing mechanics, TrA activation, and hip strength. Add breathing with pelvic floor lengthening and happy baby.  -CC               User Key  (r) = Recorded By, (t) = Taken By, (c) = Cosigned By      Initials Name Provider Type    Norma Adams PT Physical Therapist                       OP Exercises       Row Name 23 1000             Subjective Comments    Subjective Comments Sujey Schofield is a 27 y.o. female who presents today with perineal pain. Pt arrives at 10:26 for 10:15 eval. Pt reports pain ongoing for about a year. Reports pain with sexual intercourse during initial insertion and deep penetration during intercourse. Notes that she has perineal tearing following intercourse with bleeding. Has tried increased foreplay and lubricant with no changes. . Regular menstrual cycle. Denies issues with UI. Pt also reports hx of anal fissures over past year, recently saw MD  but couldn't tolerate full exam due to muscle tightness, and is currently on medication. Pt reports no issues with constipation, but does delay bowel movements due to pain. F/u with this MD again on 8/29. Reports drinking 3-5 24 ounce cups of water throughout the day. Pt reports balanced diet with fruit/veggies and takes fiber supplements. PMH includes shoulder surgery, mitral valve prolapse (hx of tachycardia), hx of ovarian cyst rupture. Reports doesn't do well with hormones (has used topical estrogen for this issues, which gave her heart attack like symptoms so is no longer taking). Pt also reports hx of chronic back pain and scoliosis at end of session.  -CC         Subjective Pain    Subjective Pain Comment Arrival time 10:26 for 10:15 eval  -CC         Total Minutes    68934 - PT Therapeutic Exercise Minutes 8  -CC         Exercise 1    Exercise Name 1 discussed perineal stretching for HEP: 4, 5,6,7, 8 oclock and U  -CC      Cueing 1 Verbal;Demo  -CC                User Key  (r) = Recorded By, (t) = Taken By, (c) = Cosigned By      Initials Name Provider Type    Norma Adams, PT Physical Therapist                                 PT OP Goals       Row Name 08/16/23 1400          PT Short Term Goals    STG Date to Achieve 09/15/23  -CC     STG 1 Patient will be independent in the performance of a home program of PFM exercises on a daily basis to increase muscle strength and recruitment as measured with digital palpation in order to increase continence control.  -CC     STG 1 Progress New  -     STG 2 Pt will verbalize and demo appropriate toileting positioning and technique for ease of bowel movements without straining  -CC     STG 2 Progress New  -        Long Term Goals    LTG Date to Achieve 10/15/23  -CC     LTG 1 Pt will be ind with comprehensive HEP for PFM/core/hip strength appropriate PF and hip mobility, for long term management of issue.  -CC     LTG 1 Progress New  -     LTG 2 Pt will  report ability to participate in pelvic exam with </=1/10 vaginal pain.  -CC     LTG 2 Progress New  -CC     LTG 3 Pt will report ability to resume sexual IC with </=2/10 vaginal pain  -CC     LTG 3 Progress New  -CC     LTG 4 Pt will report ability to have bowel movemen with </= 2/10 perineal pain.  -CC     LTG 4 Progress New  -CC     LTG 5 Pt will report Female NIH-CPSI score of </=7 to demo improved QOL  -CC     LTG 5 Progress New  -CC        Time Calculation    PT Goal Re-Cert Due Date 11/14/23  -CC               User Key  (r) = Recorded By, (t) = Taken By, (c) = Cosigned By      Initials Name Provider Type    CC Norma Magaña, PT Physical Therapist                                    Time Calculation:   Start Time: 1027  Stop Time: 1114  Time Calculation (min): 47 min  Total Timed Code Minutes- PT: 8 minute(s)  Timed Charges  11295 - PT Therapeutic Exercise Minutes: 8  Untimed Charges  PT Eval/Re-eval Minutes: 39  Total Minutes  Timed Charges Total Minutes: 8  Untimed Charges Total Minutes: 39   Total Minutes: 47  Therapy Charges for Today       Code Description Service Date Service Provider Modifiers Qty    76960216954 HC PT THER PROC EA 15 MIN 8/16/2023 Norma Magaña, PT GP 1    64968772222 HC PT EVAL MOD COMPLEXITY 3 8/16/2023 Norma Magaña, PT GP 1                    Norma Magaña PT  8/16/2023

## 2023-08-17 ENCOUNTER — OFFICE VISIT (OUTPATIENT)
Dept: FAMILY MEDICINE CLINIC | Facility: CLINIC | Age: 28
End: 2023-08-17
Payer: COMMERCIAL

## 2023-08-17 VITALS
OXYGEN SATURATION: 99 % | HEIGHT: 68 IN | WEIGHT: 167 LBS | BODY MASS INDEX: 25.31 KG/M2 | RESPIRATION RATE: 18 BRPM | DIASTOLIC BLOOD PRESSURE: 74 MMHG | HEART RATE: 74 BPM | SYSTOLIC BLOOD PRESSURE: 104 MMHG

## 2023-08-17 DIAGNOSIS — Z13.228 ENCOUNTER FOR SCREENING FOR OTHER METABOLIC DISORDERS: ICD-10-CM

## 2023-08-17 DIAGNOSIS — Z00.00 ANNUAL PHYSICAL EXAM: Primary | ICD-10-CM

## 2023-08-17 DIAGNOSIS — Z13.220 SCREENING FOR HYPERLIPIDEMIA: ICD-10-CM

## 2023-08-17 DIAGNOSIS — F90.9 ATTENTION DEFICIT HYPERACTIVITY DISORDER (ADHD), UNSPECIFIED ADHD TYPE: ICD-10-CM

## 2023-08-17 LAB
BASOPHILS # BLD AUTO: 0.04 10*3/MM3 (ref 0–0.2)
BASOPHILS NFR BLD AUTO: 0.7 % (ref 0–1.5)
EOSINOPHIL # BLD AUTO: 0.15 10*3/MM3 (ref 0–0.4)
EOSINOPHIL NFR BLD AUTO: 2.5 % (ref 0.3–6.2)
ERYTHROCYTE [DISTWIDTH] IN BLOOD BY AUTOMATED COUNT: 12 % (ref 12.3–15.4)
HCT VFR BLD AUTO: 45.8 % (ref 34–46.6)
HGB BLD-MCNC: 15.5 G/DL (ref 12–15.9)
IMM GRANULOCYTES # BLD AUTO: 0.01 10*3/MM3 (ref 0–0.05)
IMM GRANULOCYTES NFR BLD AUTO: 0.2 % (ref 0–0.5)
LYMPHOCYTES # BLD AUTO: 2.22 10*3/MM3 (ref 0.7–3.1)
LYMPHOCYTES NFR BLD AUTO: 37.4 % (ref 19.6–45.3)
MCH RBC QN AUTO: 29 PG (ref 26.6–33)
MCHC RBC AUTO-ENTMCNC: 33.8 G/DL (ref 31.5–35.7)
MCV RBC AUTO: 85.8 FL (ref 79–97)
MONOCYTES # BLD AUTO: 0.33 10*3/MM3 (ref 0.1–0.9)
MONOCYTES NFR BLD AUTO: 5.6 % (ref 5–12)
NEUTROPHILS # BLD AUTO: 3.18 10*3/MM3 (ref 1.7–7)
NEUTROPHILS NFR BLD AUTO: 53.6 % (ref 42.7–76)
NRBC BLD AUTO-RTO: 0 /100 WBC (ref 0–0.2)
PLATELET # BLD AUTO: 290 10*3/MM3 (ref 140–450)
RBC # BLD AUTO: 5.34 10*6/MM3 (ref 3.77–5.28)
WBC # BLD AUTO: 5.93 10*3/MM3 (ref 3.4–10.8)

## 2023-08-17 PROCEDURE — 99395 PREV VISIT EST AGE 18-39: CPT | Performed by: NURSE PRACTITIONER

## 2023-08-17 RX ORDER — NICOTINE 14MG/24HR
PATCH, TRANSDERMAL 24 HOURS TRANSDERMAL
COMMUNITY

## 2023-08-17 NOTE — PROGRESS NOTES
Preventive Exam    History of Present Illness: Sujey Schofield is a 27 y.o. here for check up and review of routine health maintenance. she states she is doing well and has no concerns.    Patient has ADHD. She reports that she was diagnosed in college. She is not currently taking medication for this and reports that she was previously taking vyvanse.  She is requesting to be put back on her medication. I explained that I am unable to treat ADHD, but will refer her to psychiatry to treat.     Past medical history, surgical history and family history have been reviewed.     Review of Systems   Constitutional:  Negative for appetite change, chills, fatigue and fever.   HENT:  Negative for congestion, dental problem, facial swelling, nosebleeds, postnasal drip, sinus pressure, sore throat and voice change.         Dental exam is scheduled.    Eyes: Negative.  Negative for blurred vision, double vision, photophobia and visual disturbance.        Eye exam is due.    Respiratory:  Negative for cough, chest tightness, shortness of breath and wheezing.    Cardiovascular:  Negative for chest pain, palpitations and leg swelling.   Gastrointestinal:  Negative for abdominal pain, constipation, diarrhea, nausea, rectal pain, vomiting and GERD.   Endocrine: Negative.  Negative for cold intolerance and heat intolerance.   Genitourinary: Negative.  Negative for breast discharge, breast lump, breast pain, dyspareunia, frequency, genital sores, menstrual problem, pelvic pain, pelvic pressure, vaginal bleeding and vaginal discharge.   Musculoskeletal:  Negative for arthralgias, back pain, joint swelling and myalgias.   Skin: Negative.    Allergic/Immunologic: Positive for food allergies (Blueberry, bluegrass and hops). Negative for environmental allergies.   Neurological:  Negative for dizziness, speech difficulty, weakness, numbness and headache.   Hematological: Negative.  Does not bruise/bleed easily.   Psychiatric/Behavioral:   Negative for hallucinations, sleep disturbance, suicidal ideas and depressed mood. The patient is nervous/anxious.      PHYSICAL EXAM    Vitals:    08/17/23 0905   BP: 104/74   Pulse: 74   Resp: 18   SpO2: 99%       Body mass index is 25.39 kg/mý.   BMI is >= 25 and <30. (Overweight) The following options were offered after discussion;: exercise counseling/recommendations and nutrition counseling/recommendations       Physical Exam  Vitals and nursing note reviewed.   Constitutional:       Appearance: Normal appearance. She is well-developed and overweight.   HENT:      Head: Normocephalic and atraumatic.      Right Ear: Tympanic membrane, ear canal and external ear normal.      Left Ear: Tympanic membrane, ear canal and external ear normal.      Nose: Nose normal.      Mouth/Throat:      Lips: Pink.      Mouth: Mucous membranes are moist.      Tongue: No lesions.      Palate: No mass and lesions.      Pharynx: Oropharynx is clear. Uvula midline.      Tonsils: No tonsillar exudate.   Eyes:      Conjunctiva/sclera: Conjunctivae normal.      Pupils: Pupils are equal, round, and reactive to light.   Neck:      Thyroid: No thyromegaly.   Cardiovascular:      Rate and Rhythm: Normal rate and regular rhythm.      Pulses: Normal pulses.           Dorsalis pedis pulses are 2+ on the right side and 2+ on the left side.        Posterior tibial pulses are 2+ on the right side and 2+ on the left side.      Heart sounds: Normal heart sounds. No murmur heard.  Pulmonary:      Effort: Pulmonary effort is normal.      Breath sounds: Normal breath sounds.   Abdominal:      General: Bowel sounds are normal. There is no distension.      Palpations: Abdomen is soft.      Tenderness: There is no abdominal tenderness.   Musculoskeletal:         General: No deformity. Normal range of motion.      Cervical back: Normal range of motion and neck supple.      Right lower leg: No edema.      Left lower leg: No edema.      Comments: Right ankle  with known benign tumor   Lymphadenopathy:      Head:      Right side of head: No submental, submandibular, tonsillar, preauricular, posterior auricular or occipital adenopathy.      Left side of head: No submental, submandibular, tonsillar, preauricular, posterior auricular or occipital adenopathy.      Cervical: No cervical adenopathy.      Right cervical: No superficial, deep or posterior cervical adenopathy.     Left cervical: No superficial, deep or posterior cervical adenopathy.      Upper Body:      Right upper body: No supraclavicular adenopathy.      Left upper body: No supraclavicular adenopathy.   Skin:     General: Skin is warm and dry.      Capillary Refill: Capillary refill takes 2 to 3 seconds.   Neurological:      General: No focal deficit present.      Mental Status: She is alert and oriented to person, place, and time.      Cranial Nerves: No cranial nerve deficit.      Sensory: Sensation is intact.      Motor: Motor function is intact.      Coordination: Coordination is intact.      Gait: Gait is intact.   Psychiatric:         Attention and Perception: Attention and perception normal.         Mood and Affect: Mood and affect normal.         Speech: Speech normal.         Behavior: Behavior normal. Behavior is cooperative.         Thought Content: Thought content normal.         Cognition and Memory: Cognition and memory normal.         Judgment: Judgment normal.       Procedures    Diagnoses and all orders for this visit:    1. Annual physical exam (Primary)    2. Screening for hyperlipidemia  -     Lipid Panel With / Chol / HDL Ratio    3. Encounter for screening for other metabolic disorders  -     CBC & Differential  -     Comprehensive Metabolic Panel    4. Attention deficit hyperactivity disorder (ADHD), unspecified ADHD type  -     Ambulatory Referral to Psychiatry        Problems Addressed this Visit          Mental Health    ADHD (attention deficit hyperactivity disorder)    Relevant  Orders    Ambulatory Referral to Psychiatry     Other Visit Diagnoses       Annual physical exam    -  Primary    Screening for hyperlipidemia        Relevant Orders    Lipid Panel With / Chol / HDL Ratio    Encounter for screening for other metabolic disorders        Relevant Orders    CBC & Differential    Comprehensive Metabolic Panel          Diagnoses         Codes Comments    Annual physical exam    -  Primary ICD-10-CM: Z00.00  ICD-9-CM: V70.0     Screening for hyperlipidemia     ICD-10-CM: Z13.220  ICD-9-CM: V77.91     Encounter for screening for other metabolic disorders     ICD-10-CM: Z13.228  ICD-9-CM: V77.99     Attention deficit hyperactivity disorder (ADHD), unspecified ADHD type     ICD-10-CM: F90.9  ICD-9-CM: 314.01           Lipid panel  CBC  CMP  Referral to psychiatry for ADHD treatment  Routine health maintenance reviewed and discussed with Sujey Schofield.    Preventative counseling regarding healthy diet and exercise.   Pt reports that he wears a seatbelt regularly.   Return in about 1 year (around 8/17/2024) for Annual, Labs.

## 2023-08-17 NOTE — PATIENT INSTRUCTIONS
I will call call or send [employer] message with your lab results.   Please call with any questions or concerns.    Return in about 1 year (around 8/17/2024) for Annual, Labs.

## 2023-08-18 LAB
ALBUMIN SERPL-MCNC: 4.6 G/DL (ref 3.5–5.2)
ALBUMIN/GLOB SERPL: 2 G/DL
ALP SERPL-CCNC: 64 U/L (ref 39–117)
ALT SERPL-CCNC: 15 U/L (ref 1–33)
AST SERPL-CCNC: 21 U/L (ref 1–32)
BILIRUB SERPL-MCNC: 0.5 MG/DL (ref 0–1.2)
BUN SERPL-MCNC: 16 MG/DL (ref 6–20)
BUN/CREAT SERPL: 19.5 (ref 7–25)
CALCIUM SERPL-MCNC: 10.1 MG/DL (ref 8.6–10.5)
CHLORIDE SERPL-SCNC: 102 MMOL/L (ref 98–107)
CHOLEST SERPL-MCNC: 209 MG/DL (ref 0–200)
CHOLEST/HDLC SERPL: 3.22 {RATIO}
CO2 SERPL-SCNC: 27.1 MMOL/L (ref 22–29)
CREAT SERPL-MCNC: 0.82 MG/DL (ref 0.57–1)
EGFRCR SERPLBLD CKD-EPI 2021: 99.4 ML/MIN/1.73
GLOBULIN SER CALC-MCNC: 2.3 GM/DL
GLUCOSE SERPL-MCNC: 88 MG/DL (ref 65–99)
HDLC SERPL-MCNC: 65 MG/DL (ref 40–60)
LDLC SERPL CALC-MCNC: 131 MG/DL (ref 0–100)
POTASSIUM SERPL-SCNC: 4.7 MMOL/L (ref 3.5–5.2)
PROT SERPL-MCNC: 6.9 G/DL (ref 6–8.5)
SODIUM SERPL-SCNC: 139 MMOL/L (ref 136–145)
TRIGL SERPL-MCNC: 75 MG/DL (ref 0–150)
VLDLC SERPL CALC-MCNC: 13 MG/DL (ref 5–40)

## 2023-09-08 ENCOUNTER — HOSPITAL ENCOUNTER (OUTPATIENT)
Dept: PHYSICAL THERAPY | Facility: HOSPITAL | Age: 28
Setting detail: THERAPIES SERIES
Discharge: HOME | End: 2023-09-08
Payer: COMMERCIAL

## 2023-09-08 DIAGNOSIS — R10.2 PELVIC AND PERINEAL PAIN: Primary | ICD-10-CM

## 2023-09-08 PROCEDURE — 97530 THERAPEUTIC ACTIVITIES: CPT

## 2023-09-08 PROCEDURE — 97140 MANUAL THERAPY 1/> REGIONS: CPT

## 2023-09-08 NOTE — THERAPY TREATMENT NOTE
Outpatient Physical Therapy Ortho Treatment Note  [facility]     Patient Name: Sujey Schofield  : 1995  MRN: [id number]  Today's Date: 2023      Visit Date: 2023    Visit Dx:    ICD-10-CM ICD-9-CM   1. Pelvic and perineal pain  R10.2 625.9       Patient Active Problem List   Diagnosis    Anterior dislocation of right shoulder    ADHD (attention deficit hyperactivity disorder)    Insomnia    Anxiety    Depression    Gluten intolerance    Status post labral repair of shoulder    Shoulder instability, right    Glenoid labrum tear    IUD (intrauterine device) in place        Past Medical History:   Diagnosis Date    Abnormal Pap smear of cervix     ADHD (attention deficit hyperactivity disorder)     Exercise-induced asthma     Infection     bv and yeast     Mitral valve prolapse     Ovarian cyst     Pulmonary valve regurgitation     Rhinitis, chronic     Seasonal allergies         Past Surgical History:   Procedure Laterality Date    ARTHROSCOPY SHOULDER W/ OR W/O OPEN LABRAL REPAIR      WISDOM TOOTH EXTRACTION                      Pelvic Health       Row Name 23 1000             Pelvic Floor Muscle    Patient/Parent/Guardian Consented to Internal Pelvic Floor Exam Yes  -RS         Education Provided On:    Education Points HEP;Vagina/rectal stretching;Behavioral modifications  -RS      Method of Delivery Verbal;Demonstration  -RS      Education Provided To Patient  -RS      Level of Understanding Verbalized  -RS      HEP Comments see ex column  -RS                User Key  (r) = Recorded By, (t) = Taken By, (c) = Cosigned By      Initials Name Provider Type    RS Cristiana Schulte, PT Physical Therapist                     PT Assessment/Plan       Row Name 23 1000          PT Assessment    Assessment Comments Pt returns for first folow up session after initial eval reporting no sgnificant change in symptoms. Her partner has helped with perineal massage since she couldnt reach well  aand she doesn't feel cofident in what they are doing, therefore reviewed ways she can perform a self perineal stretch in the shower with one LE elevated or with a pelvic wand. Pt reports understading. Initiated manual therapy focused on improved pelvic floor mobility and decreased tendnerness to palpation, pt with inc discomfort R compared to L but good tolerance overall.  -RS        PT Plan    PT Plan Comments Cont to focus onn PF stretching, lubrication  -RS               User Key  (r) = Recorded By, (t) = Taken By, (c) = Cosigned By      Initials Name Provider Type    RS Cristiana Schulte PT Physical Therapist                       OP Exercises       Row Name 09/08/23 1000             Subjective Comments    Subjective Comments Pt has been attempting perineal stretching witha ssist from partner but has not felt confidet with technique  -RS         Total Minutes    51166 - PT Therapeutic Activity Minutes 23  -RS      53200 - PT Manual Therapy Minutes 17  -RS         Exercise 1    Exercise Name 1 review of perineal massage- in shower,  use of hand vs wand  -RS         Exercise 2    Exercise Name 2 diaphragmatic breathing in HL  -RS      Cueing 2 Verbal;Tactile  -RS      Additional Comments cues for technique- PF relaxation  -RS         Exercise 3    Exercise Name 3 Diaphragmatic breathing in coy pose  -RS         Exercise 4    Exercise Name 4 review of use of wand for self STM  -RS         Exercise 5    Exercise Name 5 review of lubricants  -RS                User Key  (r) = Recorded By, (t) = Taken By, (c) = Cosigned By      Initials Name Provider Type    RS Cristiana Schulte PT Physical Therapist                             Manual Rx (last 36 hours)       Manual Treatments       Row Name 09/08/23 1000             Total Minutes    54009 - PT Manual Therapy Minutes 17  -RS         Manual Rx 1    Manual Rx 1 Location perineal massage- inc discomfort R comapred to L  -RS         Manual Rx 2    Manual Rx 2  Location B OI and LA sustained release- focus on R>L  -RS                User Key  (r) = Recorded By, (t) = Taken By, (c) = Cosigned By      Initials Name Provider Type    Cristiana Licona PT Physical Therapist                     PT OP Goals       Row Name 09/08/23 1000          PT Short Term Goals    STG Date to Achieve 09/15/23  -RS     STG 1 Patient will be independent in the performance of a home program of PFM exercises on a daily basis to increase muscle strength and recruitment as measured with digital palpation in order to increase continence control.  -RS     STG 1 Progress Ongoing  -RS     STG 2 Pt will verbalize and demo appropriate toileting positioning and technique for ease of bowel movements without straining  -RS     STG 2 Progress Ongoing  -RS        Long Term Goals    LTG Date to Achieve 10/15/23  -RS     LTG 1 Pt will be ind with comprehensive HEP for PFM/core/hip strength appropriate PF and hip mobility, for long term management of issue.  -RS     LTG 1 Progress Ongoing  -RS     LTG 2 Pt will report ability to participate in pelvic exam with </=1/10 vaginal pain.  -RS     LTG 2 Progress Ongoing  -RS     LTG 3 Pt will report ability to resume sexual IC with </=2/10 vaginal pain  -RS     LTG 3 Progress Ongoing  -RS     LTG 4 Pt will report ability to have bowel movemen with </= 2/10 perineal pain.  -RS     LTG 4 Progress Ongoing  -RS     LTG 5 Pt will report Female NIH-CPSI score of </=7 to demo improved QOL  -RS     LTG 5 Progress Ongoing  -RS               User Key  (r) = Recorded By, (t) = Taken By, (c) = Cosigned By      Initials Name Provider Type    Cristiana Licona PT Physical Therapist                                   Time Calculation:   Start Time: 1004  Stop Time: 1045  Time Calculation (min): 41 min  Timed Charges  83886 - PT Manual Therapy Minutes: 17  13178 - PT Therapeutic Activity Minutes: 23  Total Minutes  Timed Charges Total Minutes: 40   Total Minutes: 40  Therapy  Charges for Today       Code Description Service Date Service Provider Modifiers Qty    29734880548  PT THERAPEUTIC ACT EA 15 MIN 9/8/2023 Cristiana Schulte, PT GP 2    [id number]  PT MANUAL THERAPY EA 15 MIN 9/8/2023 Cristiana Schulte, PT GP 1                      Cristiana Schulte, PT  9/8/2023

## 2023-09-14 ENCOUNTER — HOSPITAL ENCOUNTER (OUTPATIENT)
Dept: PHYSICAL THERAPY | Facility: HOSPITAL | Age: 28
Setting detail: THERAPIES SERIES
Discharge: HOME | End: 2023-09-14
Payer: COMMERCIAL

## 2023-09-14 DIAGNOSIS — R10.2 PELVIC AND PERINEAL PAIN: Primary | ICD-10-CM

## 2023-09-14 PROCEDURE — 97530 THERAPEUTIC ACTIVITIES: CPT

## 2023-09-14 NOTE — THERAPY PROGRESS REPORT/RE-CERT
Outpatient Physical Therapy Ortho Progress Note  [facility]     Patient Name: Sujey Schofield  : 1995  MRN: [id number]  Today's Date: 2023      Visit Date: 2023    Visit Dx:    ICD-10-CM ICD-9-CM   1. Pelvic and perineal pain  R10.2 625.9       Patient Active Problem List   Diagnosis    Anterior dislocation of right shoulder    ADHD (attention deficit hyperactivity disorder)    Insomnia    Anxiety    Depression    Gluten intolerance    Status post labral repair of shoulder    Shoulder instability, right    Glenoid labrum tear    IUD (intrauterine device) in place        Past Medical History:   Diagnosis Date    Abnormal Pap smear of cervix     ADHD (attention deficit hyperactivity disorder)     Exercise-induced asthma     Infection     bv and yeast     Mitral valve prolapse     Ovarian cyst     Pulmonary valve regurgitation     Rhinitis, chronic     Seasonal allergies         Past Surgical History:   Procedure Laterality Date    ARTHROSCOPY SHOULDER W/ OR W/O OPEN LABRAL REPAIR      WISDOM TOOTH EXTRACTION                      Pelvic Health       Row Name 23 0900             Pelvic Floor Muscle    Patient/Parent/Guardian Consented to Internal Pelvic Floor Exam Yes  -RS                User Key  (r) = Recorded By, (t) = Taken By, (c) = Cosigned By      Initials Name Provider Type    RS Cristiana Schulte, PT Physical Therapist                     PT Assessment/Plan       Row Name 23 1000          PT Assessment    Functional Limitations Limitation in home management;Limitations in community activities;Limitations in functional capacity and performance;Performance in leisure activities;Performance in self-care ADL  -RS     Impairments Posture;Poor body mechanics;Pain;Muscle strength;Impaired flexibility;Impaired muscle length  -RS     Assessment Comments Pt has been seen by PT for 3 sessions focused on pelvic and perineal pain. She has partially met 2/2 STG and 0 LTG however is  progressing well toward remaining goals. Performed perineal massage this date and assessed diaphragmatic breathing with interal cues, pt with improvevment in pelvic floor coordination noted. Discussed toileting positioning for BM and recommended pt elevate knees above knees, also reviewed open glottis pushing while having BM, pt reports understanding however will practice at home. Pt remains appropriate for skilled PT.  -RS        PT Plan    PT Plan Comments Assess tolerance for pelvic exam and IC, follow up regarding lubricant/HA, consider strengtheninng/elevator  -RS               User Key  (r) = Recorded By, (t) = Taken By, (c) = Cosigned By      Initials Name Provider Type    RS Cristiana Schulte PT Physical Therapist                       OP Exercises       Row Name 09/14/23 0900             Subjective    Subjective Comments Pt has not attempted perineal massage since last session, no significant changes overal  -RS         Total Minutes    61860 - PT Therapeutic Activity Minutes 23  -RS      59374 - PT Manual Therapy Minutes 8  -RS         Exercise 1    Exercise Name 1 review of perineal massage- in shower,  use of hand lubricant, warm up for intercourse  -RS         Exercise 2    Exercise Name 2 diaphragmatic breathing in HL  -RS      Cueing 2 Verbal;Tactile  -RS      Reps 2 in HL and happy baby stretch  -RS         Exercise 3    Exercise Name 3 discussion of pelvic floor stretching in happy baby aand coy pose stretch  -RS         Exercise 4    Exercise Name 4 discussion of toileting positions- squatty potty, open glottis pushing  -RS         Exercise 5    Exercise Name 5 review of lubricants pt bought hydrsting and paraben free lubricant  -RS         Exercise 6    Exercise Name 6 use of breathing during sexual intercourse and pelvic exam for PFM relaxation  -RS                User Key  (r) = Recorded By, (t) = Taken By, (c) = Cosigned By      Initials Name Provider Type    RS Cristiana Schulte, PT  Physical Therapist                             Manual Rx (last 36 hours)       Manual Treatments       Row Name 09/14/23 0900             Total Minutes    34231 - PT Manual Therapy Minutes 8  -RS         Manual Rx 1    Manual Rx 1 Location perineal massage- inc discomfort R comapred to L  -RS                User Key  (r) = Recorded By, (t) = Taken By, (c) = Cosigned By      Initials Name Provider Type    RS Cristiana Schulte, PT Physical Therapist                     PT OP Goals       Row Name 09/14/23 0900          PT Short Term Goals    STG Date to Achieve 09/15/23  -RS     STG 1 Patient will be independent in the performance of a home program of PFM exercises on a daily basis to increase muscle strength and recruitment as measured with digital palpation in order to increase continence control.  -RS     STG 1 Progress Progressing  -RS     STG 1 Progress Comments has performed  -RS     STG 2 Pt will verbalize and demo appropriate toileting positioning and technique for ease of bowel movements without straining  -RS     STG 2 Progress Partially Met  -RS     STG 2 Progress Comments discussed this date- toileting and open vs closed gliottis pushing  -RS        Long Term Goals    LTG Date to Achieve 10/15/23  -RS     LTG 1 Pt will be ind with comprehensive HEP for PFM/core/hip strength appropriate PF and hip mobility, for long term management of issue.  -RS     LTG 1 Progress Ongoing;Progressing  -RS     LTG 1 Progress Comments updating as appropriate  -RS     LTG 2 Pt will report ability to participate in pelvic exam with </=1/10 vaginal pain.  -RS     LTG 2 Progress Ongoing;Progressing  -RS     LTG 2 Progress Comments has exam next week  -RS     LTG 3 Pt will report ability to resume sexual IC with </=2/10 vaginal pain  -RS     LTG 3 Progress Ongoing;Progressing  -RS     LTG 3 Progress Comments has not attempted in the past 3 weeks  -RS     LTG 4 Pt will report ability to have bowel movement with </= 2/10 perineal  pain.  -RS     LTG 4 Progress Ongoing;Progressing  -RS     LTG 4 Progress Comments discussed apprpriate pushing this daate  -RS     LTG 5 Pt will report Female NIH-CPSI score of </=7 to demo improved QOL  -RS     LTG 5 Progress Ongoing  -RS               User Key  (r) = Recorded By, (t) = Taken By, (c) = Cosigned By      Initials Name Provider Type    RS Cristiana Schulte, PT Physical Therapist                    Therapy Education  Education Details: see ex column  Given: HEP  Program: Reinforced  How Provided: Verbal, Demonstration  Provided to: Patient  Level of Understanding: Verbalized, Demonstrated              Time Calculation:   Start Time: 0936  Stop Time: 1009  Time Calculation (min): 33 min  Timed Charges  69537 - PT Manual Therapy Minutes: 8  94152 - PT Therapeutic Activity Minutes: 23  Total Minutes  Timed Charges Total Minutes: 31   Total Minutes: 31  Therapy Charges for Today       Code Description Service Date Service Provider Modifiers Qty    [id number] HC PT THERAPEUTIC ACT EA 15 MIN 9/14/2023 Cristiana Schulte, PT GP 2                      Cristiana Schulte PT  9/14/2023

## 2023-09-28 ENCOUNTER — TELEPHONE (OUTPATIENT)
Dept: PHYSICAL THERAPY | Facility: HOSPITAL | Age: 28
End: 2023-09-28
Payer: COMMERCIAL

## 2023-10-06 ENCOUNTER — OFFICE VISIT (OUTPATIENT)
Dept: OBSTETRICS AND GYNECOLOGY | Age: 28
End: 2023-10-06
Payer: COMMERCIAL

## 2023-10-06 VITALS
DIASTOLIC BLOOD PRESSURE: 70 MMHG | HEIGHT: 68 IN | SYSTOLIC BLOOD PRESSURE: 118 MMHG | BODY MASS INDEX: 25.94 KG/M2 | WEIGHT: 171.2 LBS

## 2023-10-06 DIAGNOSIS — R30.0 DYSURIA: Primary | ICD-10-CM

## 2023-10-06 DIAGNOSIS — N92.0 MENORRHAGIA WITH REGULAR CYCLE: ICD-10-CM

## 2023-10-06 DIAGNOSIS — N39.0 RECURRENT UTI: ICD-10-CM

## 2023-10-06 LAB
BILIRUB BLD-MCNC: NEGATIVE MG/DL
CLARITY, POC: CLEAR
COLOR UR: YELLOW
GLUCOSE UR STRIP-MCNC: NEGATIVE MG/DL
KETONES UR QL: NEGATIVE
LEUKOCYTE EST, POC: ABNORMAL
NITRITE UR-MCNC: NEGATIVE MG/ML
PH UR: 7 [PH] (ref 5–8)
PROT UR STRIP-MCNC: NEGATIVE MG/DL
RBC # UR STRIP: ABNORMAL /UL
SP GR UR: 1.02 (ref 1–1.03)
UROBILINOGEN UR QL: ABNORMAL

## 2023-10-06 NOTE — PROGRESS NOTES
"Subjective     Chief Complaint   Patient presents with    Gynecologic Exam     6 wk f/u, vaginal tear  CC : patient stated \" thinks have uti , no other issues    Urinary Frequency       Sujey Schofield is a 27 y.o.  whose LMP is Patient's last menstrual period was 2023 (exact date).     Pt presents today with ongoing issues with perineal tear/discomfort  She did do several sessions of pelvic PT  She has not had IC since then  She is not having any issues with this area currently  It was recommended to continue perineal massage at home and to use certain lubricant with IC  She plans to use lubrication next time she has IC     Did blood work through third party for fatigue   Requested to discuss PCOS based on blood work  Blood work rev'd and labs were normal  She has ParaGard and has regular menses  She does not heavier and more painful periods  Paraguard good for another year. Discussed options of OCP's to manage menses. Can consider hormonal IUD next time.    No Additional Complaints Reported    The following portions of the patient's history were reviewed and updated as appropriate:vital signs, allergies, current medications, past medical history, past social history, past surgical history, and problem list      Review of Systems   Pertinent items are noted in HPI.     Objective      /70 (BP Location: Left arm, Patient Position: Sitting, Cuff Size: Adult)   Ht 172.7 cm (68\")   Wt 77.7 kg (171 lb 3.2 oz)   LMP 2023 (Exact Date)   BMI 26.03 kg/m²     Physical Exam    General:   alert and no distress   Heart: Not performed today   Lungs: Not performed today.   Breast: Not performed today   Neck: na   Abdomen: {Not performed today   CVA: Not performed today   Pelvis: External genitalia: normal general appearance   Extremities: Not performed today   Neurologic: negative   Psychiatric: Normal affect, judgement, and mood       Lab Review   Labs: No data reviewed     Imaging   No data " reviewed    Assessment & Plan     ASSESSMENT  1. Dysuria    2. Recurrent UTI    3. Menorrhagia with regular cycle        PLAN  1.   Orders Placed This Encounter   Procedures    Urine Culture - Urine, Urine, Clean Catch    Ambulatory Referral to Urology    POC Urinalysis Dipstick       2. Medications prescribed this encounter:      No orders of the defined types were placed in this encounter.      3. Pt states she has had 5 UTI's this year. Referral placed to urology. Wants to wait for urine culture before taking abx. Will call with results.    F/u PRN    ROOSEVELT Nicholson  10/6/2023

## 2023-10-08 LAB
BACTERIA UR CULT: NORMAL
BACTERIA UR CULT: NORMAL

## 2024-01-12 ENCOUNTER — OFFICE VISIT (OUTPATIENT)
Dept: OBSTETRICS AND GYNECOLOGY | Age: 29
End: 2024-01-12
Payer: COMMERCIAL

## 2024-01-12 VITALS
WEIGHT: 170 LBS | SYSTOLIC BLOOD PRESSURE: 120 MMHG | HEIGHT: 68 IN | BODY MASS INDEX: 25.76 KG/M2 | DIASTOLIC BLOOD PRESSURE: 72 MMHG

## 2024-01-12 DIAGNOSIS — N89.8 VAGINAL DISCHARGE: ICD-10-CM

## 2024-01-12 DIAGNOSIS — Z12.4 SCREENING FOR MALIGNANT NEOPLASM OF THE CERVIX: ICD-10-CM

## 2024-01-12 DIAGNOSIS — Z97.5 IUD (INTRAUTERINE DEVICE) IN PLACE: ICD-10-CM

## 2024-01-12 DIAGNOSIS — Z13.89 SCREENING FOR BLOOD OR PROTEIN IN URINE: ICD-10-CM

## 2024-01-12 DIAGNOSIS — Z30.431 ENCOUNTER FOR ROUTINE CHECKING OF INTRAUTERINE CONTRACEPTIVE DEVICE (IUD): ICD-10-CM

## 2024-01-12 DIAGNOSIS — Z01.419 ENCOUNTER FOR GYNECOLOGICAL EXAMINATION WITHOUT ABNORMAL FINDING: Primary | ICD-10-CM

## 2024-01-12 LAB
BILIRUB BLD-MCNC: NEGATIVE MG/DL
GLUCOSE UR STRIP-MCNC: NEGATIVE MG/DL
KETONES UR QL: NEGATIVE
LEUKOCYTE EST, POC: NEGATIVE
NITRITE UR-MCNC: NEGATIVE MG/ML
PH UR: 6.5 [PH] (ref 5–8)
PROT UR STRIP-MCNC: NEGATIVE MG/DL
RBC # UR STRIP: NEGATIVE /UL
SP GR UR: 1.02 (ref 1–1.03)
UROBILINOGEN UR QL: NORMAL

## 2024-01-12 NOTE — PROGRESS NOTES
"Michelle Schofield is a 28 y.o. female presents for  annual visit today  last pap 10/12/22  neg paragard for contraception - normal periods , using lubricant for IC had some issues with vaginal tear. Reports having vaginal odor with vaginal milky discharge.  No urinary issues.  Patient reports she had a negative reaction to the Imvexxy.  Will give Premarin samples today.  Encouraged use of very small amount on posterior fourchette.  I last saw her for her annual in October 2022.  She complained of irritation between vagina and anus during IC.  Vaginal estrogen (Imvexxy) given for posterior fourchette.   History of Present Illness    The following portions of the patient's history were reviewed and updated as appropriate: allergies, current medications, past family history, past medical history, past social history, past surgical history, and problem list.    Review of Systems   Constitutional:  Negative for chills, fatigue and fever.   Gastrointestinal:  Negative for abdominal distention and abdominal pain.   Genitourinary:  Positive for dyspareunia and vaginal pain. Negative for dysuria, menstrual problem, pelvic pain, vaginal bleeding and vaginal discharge.   All other systems reviewed and are negative.    /72   Ht 172.7 cm (68\")   Wt 77.1 kg (170 lb)   LMP 12/19/2023 (Exact Date)   BMI 25.85 kg/m²     Objective   Physical Exam  Vitals and nursing note reviewed.   Constitutional:       Appearance: Normal appearance. She is well-developed and normal weight.   Neck:      Thyroid: No thyromegaly.   Pulmonary:      Effort: Pulmonary effort is normal.   Chest:   Breasts:     Right: No mass, nipple discharge, skin change or tenderness.      Left: No mass, nipple discharge, skin change or tenderness.   Abdominal:      General: There is no distension.      Palpations: Abdomen is soft.      Tenderness: There is no abdominal tenderness.   Genitourinary:     General: Normal vulva.      Exam position: " Lithotomy position.      Labia:         Right: No rash or lesion.         Left: No rash or lesion.       Vagina: Normal. No vaginal discharge or bleeding.      Cervix: No friability or lesion.      Uterus: Not enlarged and not tender.       Adnexa:         Right: No mass or tenderness.          Left: No mass or tenderness.        Comments: IUD strings visualized at os  Musculoskeletal:         General: Normal range of motion.   Skin:     General: Skin is warm and dry.      Findings: No rash.   Neurological:      Mental Status: She is alert and oriented to person, place, and time.   Psychiatric:         Mood and Affect: Mood normal.         Behavior: Behavior normal.           Assessment & Plan   Diagnoses and all orders for this visit:    1. Encounter for gynecological examination without abnormal finding (Primary)    2. Screening for blood or protein in urine  -     POC Urinalysis Dipstick    3. Screening for malignant neoplasm of the cervix  -     IgP, Aptima HPV    4. Vaginal discharge  -     NuSwab VG+ - Swab, Vagina    5. IUD (intrauterine device) in place    6. Encounter for routine checking of intrauterine contraceptive device (IUD)    Counseling was given to patient for the following topics: instructions for management, impressions, importance of treatment compliance, and self breast exams  .   Return in about 1 year (around 1/12/2025) for Annual physical.

## 2024-01-16 LAB
A VAGINAE DNA VAG QL NAA+PROBE: NORMAL SCORE
BVAB2 DNA VAG QL NAA+PROBE: NORMAL SCORE
C ALBICANS DNA VAG QL NAA+PROBE: NEGATIVE
C GLABRATA DNA VAG QL NAA+PROBE: NEGATIVE
C TRACH DNA VAG QL NAA+PROBE: NEGATIVE
CYTOLOGIST CVX/VAG CYTO: NORMAL
CYTOLOGY CVX/VAG DOC CYTO: NORMAL
CYTOLOGY CVX/VAG DOC THIN PREP: NORMAL
DX ICD CODE: NORMAL
HIV 1 & 2 AB SER-IMP: NORMAL
HPV I/H RISK 4 DNA CVX QL PROBE+SIG AMP: NEGATIVE
MEGA1 DNA VAG QL NAA+PROBE: NORMAL SCORE
N GONORRHOEA DNA VAG QL NAA+PROBE: NEGATIVE
OTHER STN SPEC: NORMAL
STAT OF ADQ CVX/VAG CYTO-IMP: NORMAL
T VAGINALIS DNA VAG QL NAA+PROBE: NEGATIVE

## 2024-05-17 ENCOUNTER — TELEPHONE (OUTPATIENT)
Dept: OBSTETRICS AND GYNECOLOGY | Age: 29
End: 2024-05-17
Payer: COMMERCIAL

## 2024-05-17 ENCOUNTER — OFFICE VISIT (OUTPATIENT)
Dept: OBSTETRICS AND GYNECOLOGY | Age: 29
End: 2024-05-17
Payer: COMMERCIAL

## 2024-05-17 VITALS
SYSTOLIC BLOOD PRESSURE: 116 MMHG | BODY MASS INDEX: 27.86 KG/M2 | HEIGHT: 68 IN | WEIGHT: 183.8 LBS | DIASTOLIC BLOOD PRESSURE: 74 MMHG

## 2024-05-17 DIAGNOSIS — N92.6 IRREGULAR BLEEDING: ICD-10-CM

## 2024-05-17 DIAGNOSIS — Z13.89 SCREENING FOR BLOOD OR PROTEIN IN URINE: ICD-10-CM

## 2024-05-17 DIAGNOSIS — Z97.5 IUD (INTRAUTERINE DEVICE) IN PLACE: Primary | ICD-10-CM

## 2024-05-17 DIAGNOSIS — Z13.0 SCREENING, ANEMIA, DEFICIENCY, IRON: ICD-10-CM

## 2024-05-17 LAB
B-HCG UR QL: NEGATIVE
BILIRUB BLD-MCNC: NEGATIVE MG/DL
EXPIRATION DATE: NORMAL
GLUCOSE UR STRIP-MCNC: NEGATIVE MG/DL
INTERNAL NEGATIVE CONTROL: NEGATIVE
INTERNAL POSITIVE CONTROL: POSITIVE
KETONES UR QL: NEGATIVE
LEUKOCYTE EST, POC: NEGATIVE
Lab: NORMAL
NITRITE UR-MCNC: NEGATIVE MG/ML
PH UR: 5.5 [PH] (ref 5–8)
PROT UR STRIP-MCNC: NEGATIVE MG/DL
RBC # UR STRIP: ABNORMAL /UL
SP GR UR: 1.02 (ref 1–1.03)
UROBILINOGEN UR QL: ABNORMAL

## 2024-05-17 NOTE — TELEPHONE ENCOUNTER
Patient calling to report severe cramping,headache and heavy bleeding.LMP 5/13/24.Was unable to get out of bed yesterday due to the pain.Pain scale 8.5-9 out of 10.Has Paragard iud.

## 2024-05-17 NOTE — PROGRESS NOTES
"Subjective     History of Present Illness  Sujey Schofield is a 28 y.o.  female is being seen today for   Chief Complaint   Patient presents with    Gynecologic Exam     Pt c/o Pelvic cramping with IUD, US today.      C/o pelvic cramping x 2 days. Reports cramps were so intense yesterday she could only lay down, was bleeding through a tampon in 2 hours. Menses regular. Has IUD, Paragard inserted in . States it is time for her menses currently. Experienced lower back pain earlier in the week. Used heating pads for relief and aleve / tylenol, which gave some relief. Reports cramping pain is not severe currently. Has had vaginal bleeding for 5 days now, bleeding has lessened.   TVUS completed today shows IUD in normal position with bilateral ovaries normal.     Imaging reviewed:  US Non-ob Transvaginal (2024 13:12)     The following portions of the patient's history were reviewed and updated as appropriate: allergies, current medications, past family history, past medical history, past social history, past surgical history and problem list.    /74   Ht 172.7 cm (68\")   Wt 83.4 kg (183 lb 12.8 oz)   BMI 27.95 kg/m²         Review of Systems   Constitutional: Negative.    HENT: Negative.     Eyes: Negative.    Respiratory: Negative.     Cardiovascular: Negative.    Gastrointestinal: Negative.    Endocrine: Negative.    Genitourinary:  Positive for pelvic pain and vaginal bleeding.   Musculoskeletal: Negative.    Skin: Negative.    Allergic/Immunologic: Negative.    Neurological: Negative.    Hematological: Negative.    Psychiatric/Behavioral: Negative.         Objective   Physical Exam  Constitutional:       General: She is not in acute distress.  Cardiovascular:      Rate and Rhythm: Normal rate and regular rhythm.      Pulses: Normal pulses.      Heart sounds: Normal heart sounds.   Pulmonary:      Effort: Pulmonary effort is normal.      Breath sounds: Normal breath sounds.   Neurological:    "   General: No focal deficit present.      Mental Status: She is alert and oriented to person, place, and time.   Psychiatric:         Mood and Affect: Mood normal.         Behavior: Behavior normal.         Thought Content: Thought content normal.         Judgment: Judgment normal.           Assessment & Plan   Diagnoses and all orders for this visit:    1. IUD (intrauterine device) in place (Primary)    2. Irregular bleeding  -     CBC (No Diff)  -     POC Urinalysis Dipstick  -     POC Pregnancy, Urine    3. Screening, anemia, deficiency, iron  -     CBC (No Diff)    4. Screening for blood or protein in urine  -     POC Urinalysis Dipstick    -Reviewed TVUS findings with patient: IUD in normal position with bilateral ovaries normal. Discussed that cramp like pain is likely due to menses. Advised ibuprofen 600mg every 6 hours as needed for pain. Urine pregnancy test was negative, offered lysteda prescription to stop bleeding but patient declined at this time.   -UA negative for UTI.   -CBC completed today, will call pt with results and treat accordingly.      All questions answered. Patient verbalizes understanding and is agreeable to plan.  Return if symptoms worsen or fail to improve.

## 2024-05-18 LAB
ERYTHROCYTE [DISTWIDTH] IN BLOOD BY AUTOMATED COUNT: 12.6 % (ref 12.3–15.4)
HCT VFR BLD AUTO: 42.4 % (ref 34–46.6)
HGB BLD-MCNC: 14.1 G/DL (ref 12–15.9)
MCH RBC QN AUTO: 28.5 PG (ref 26.6–33)
MCHC RBC AUTO-ENTMCNC: 33.3 G/DL (ref 31.5–35.7)
MCV RBC AUTO: 85.8 FL (ref 79–97)
PLATELET # BLD AUTO: 268 10*3/MM3 (ref 140–450)
RBC # BLD AUTO: 4.94 10*6/MM3 (ref 3.77–5.28)
WBC # BLD AUTO: 5.88 10*3/MM3 (ref 3.4–10.8)

## 2024-09-30 ENCOUNTER — TELEPHONE (OUTPATIENT)
Dept: OBSTETRICS AND GYNECOLOGY | Age: 29
End: 2024-09-30
Payer: COMMERCIAL

## 2024-09-30 NOTE — TELEPHONE ENCOUNTER
----- Message from [facility] vLine sent at 9/28/2024  2:40 PM EDT -----  Regarding: Appointment Request  Contact: [phone]  Appointment Request From: Sujey Schofield    With Provider: Gricelda Levy [[facility]]    Preferred Date Range: 10/2/2024 – 10/4/2024    Preferred Times: Any Time    Reason for visit: In-Office Procedure    Comments:  Discuss increased pain during menstruation. Pain is interfering with daily life.

## 2024-10-03 ENCOUNTER — OFFICE VISIT (OUTPATIENT)
Dept: FAMILY MEDICINE CLINIC | Facility: CLINIC | Age: 29
End: 2024-10-03
Payer: COMMERCIAL

## 2024-10-03 VITALS
WEIGHT: 171 LBS | BODY MASS INDEX: 25.91 KG/M2 | HEART RATE: 68 BPM | DIASTOLIC BLOOD PRESSURE: 82 MMHG | RESPIRATION RATE: 18 BRPM | HEIGHT: 68 IN | OXYGEN SATURATION: 98 % | SYSTOLIC BLOOD PRESSURE: 120 MMHG

## 2024-10-03 DIAGNOSIS — Z00.00 ANNUAL PHYSICAL EXAM: Primary | ICD-10-CM

## 2024-10-03 DIAGNOSIS — Z13.220 SCREENING FOR HYPERLIPIDEMIA: ICD-10-CM

## 2024-10-03 DIAGNOSIS — Z13.228 ENCOUNTER FOR SCREENING FOR OTHER METABOLIC DISORDERS: ICD-10-CM

## 2024-10-03 DIAGNOSIS — R21 RASH: ICD-10-CM

## 2024-10-03 LAB
ALBUMIN SERPL-MCNC: 4.4 G/DL (ref 3.5–5.2)
ALBUMIN/GLOB SERPL: 1.9 G/DL
ALP SERPL-CCNC: 74 U/L (ref 39–117)
ALT SERPL-CCNC: 12 U/L (ref 1–33)
AST SERPL-CCNC: 20 U/L (ref 1–32)
BASOPHILS # BLD AUTO: 0.03 10*3/MM3 (ref 0–0.2)
BASOPHILS NFR BLD AUTO: 0.4 % (ref 0–1.5)
BILIRUB SERPL-MCNC: 0.7 MG/DL (ref 0–1.2)
BUN SERPL-MCNC: 15 MG/DL (ref 6–20)
BUN/CREAT SERPL: 18.3 (ref 7–25)
CALCIUM SERPL-MCNC: 9.7 MG/DL (ref 8.6–10.5)
CHLORIDE SERPL-SCNC: 106 MMOL/L (ref 98–107)
CHOLEST SERPL-MCNC: 202 MG/DL (ref 0–200)
CHOLEST/HDLC SERPL: 3.48 {RATIO}
CO2 SERPL-SCNC: 27.6 MMOL/L (ref 22–29)
CREAT SERPL-MCNC: 0.82 MG/DL (ref 0.57–1)
EGFRCR SERPLBLD CKD-EPI 2021: 98.8 ML/MIN/1.73
EOSINOPHIL # BLD AUTO: 0.26 10*3/MM3 (ref 0–0.4)
EOSINOPHIL NFR BLD AUTO: 3.5 % (ref 0.3–6.2)
ERYTHROCYTE [DISTWIDTH] IN BLOOD BY AUTOMATED COUNT: 12.2 % (ref 12.3–15.4)
GLOBULIN SER CALC-MCNC: 2.3 GM/DL
GLUCOSE SERPL-MCNC: 91 MG/DL (ref 65–99)
HCT VFR BLD AUTO: 44.5 % (ref 34–46.6)
HDLC SERPL-MCNC: 58 MG/DL (ref 40–60)
HGB BLD-MCNC: 14.6 G/DL (ref 12–15.9)
IMM GRANULOCYTES # BLD AUTO: 0.02 10*3/MM3 (ref 0–0.05)
IMM GRANULOCYTES NFR BLD AUTO: 0.3 % (ref 0–0.5)
LDLC SERPL CALC-MCNC: 124 MG/DL (ref 0–100)
LYMPHOCYTES # BLD AUTO: 3.07 10*3/MM3 (ref 0.7–3.1)
LYMPHOCYTES NFR BLD AUTO: 41.2 % (ref 19.6–45.3)
MCH RBC QN AUTO: 28.6 PG (ref 26.6–33)
MCHC RBC AUTO-ENTMCNC: 32.8 G/DL (ref 31.5–35.7)
MCV RBC AUTO: 87.1 FL (ref 79–97)
MONOCYTES # BLD AUTO: 0.49 10*3/MM3 (ref 0.1–0.9)
MONOCYTES NFR BLD AUTO: 6.6 % (ref 5–12)
NEUTROPHILS # BLD AUTO: 3.59 10*3/MM3 (ref 1.7–7)
NEUTROPHILS NFR BLD AUTO: 48 % (ref 42.7–76)
NRBC BLD AUTO-RTO: 0 /100 WBC (ref 0–0.2)
PLATELET # BLD AUTO: 296 10*3/MM3 (ref 140–450)
POTASSIUM SERPL-SCNC: 4.7 MMOL/L (ref 3.5–5.2)
PROT SERPL-MCNC: 6.7 G/DL (ref 6–8.5)
RBC # BLD AUTO: 5.11 10*6/MM3 (ref 3.77–5.28)
SODIUM SERPL-SCNC: 144 MMOL/L (ref 136–145)
TRIGL SERPL-MCNC: 114 MG/DL (ref 0–150)
VLDLC SERPL CALC-MCNC: 20 MG/DL (ref 5–40)
WBC # BLD AUTO: 7.46 10*3/MM3 (ref 3.4–10.8)

## 2024-10-03 PROCEDURE — 99395 PREV VISIT EST AGE 18-39: CPT | Performed by: NURSE PRACTITIONER

## 2024-10-03 RX ORDER — FLUTICASONE PROPIONATE 50 UG/1
2 SPRAY, METERED NASAL DAILY
Qty: 9.9 ML | Refills: 2 | Status: SHIPPED | OUTPATIENT
Start: 2024-10-03 | End: 2024-10-03

## 2024-10-03 RX ORDER — FLUTICASONE PROPIONATE 0.05 MG/G
1 OINTMENT TOPICAL 2 TIMES DAILY
Qty: 15 G | Refills: 2 | Status: SHIPPED | OUTPATIENT
Start: 2024-10-03

## 2024-10-03 NOTE — PATIENT INSTRUCTIONS
I will call call or send .com message with your lab results.   Please call with any questions or concerns.    Return in about 1 year (around 10/3/2025) for Annual, Labs.

## 2024-10-03 NOTE — PROGRESS NOTES
Preventive Exam    History of Present Illness: Sujey Schofield is a 28 y.o. here for check up and review of routine health maintenance. she states she is doing well and has the below concerns.    Patient reports rash to her right ankle, acute. It was originally on the outside of her ankle,  but has spread to the medial side. She reports that she's put on eucerin, which did not help with itching, but did help with scaling.     Patient has been having heavy periods with cramping and nausea. This occurred earlier this year and she had vaginal ultrasound that was normal. Her ParaGard is reaching it's maturity. She is trying to get in with her OB/GYN for this. Reports that it is affecting daily life.     Past medical history, surgical history and family history have been reviewed.       Review of Systems   Constitutional:  Negative for appetite change, chills, fatigue and fever.   HENT:  Positive for congestion and rhinorrhea. Negative for dental problem, facial swelling, postnasal drip, sinus pressure and sore throat.         Dental exam is up to date.    Eyes: Negative.  Negative for blurred vision, double vision, photophobia and visual disturbance.        Eye exam is due.    Respiratory:  Negative for cough, chest tightness, shortness of breath and wheezing.    Cardiovascular:  Negative for chest pain, palpitations and leg swelling.   Gastrointestinal:  Positive for nausea (with period). Negative for abdominal pain, constipation, diarrhea, vomiting and GERD.   Endocrine: Negative.  Negative for cold intolerance and heat intolerance.   Genitourinary:  Positive for menstrual problem (cramping), pelvic pain and pelvic pressure. Negative for breast discharge, breast lump, breast pain, decreased urine volume, difficulty urinating, dysuria, frequency, vaginal bleeding and vaginal discharge.   Musculoskeletal:  Negative for arthralgias, back pain, joint swelling and myalgias.   Skin:  Positive for rash (right ankle).  Negative for color change and dry skin.   Allergic/Immunologic: Negative.  Negative for environmental allergies and food allergies.   Neurological:  Positive for headache (with periods). Negative for dizziness, syncope, weakness and numbness.   Hematological: Negative.  Does not bruise/bleed easily.   Psychiatric/Behavioral: Negative.  Negative for sleep disturbance, suicidal ideas and depressed mood. The patient is not nervous/anxious.        PHYSICAL EXAM    Vitals:    10/03/24 0957   BP: 120/82   Pulse: 68   Resp: 18   SpO2: 98%       Body mass index is 26 kg/m².   BMI is >= 25 and <30. (Overweight) The following options were offered after discussion;: exercise counseling/recommendations and nutrition counseling/recommendations       Physical Exam  Vitals and nursing note reviewed.   Constitutional:       Appearance: Normal appearance. She is well-developed.   HENT:      Head: Normocephalic and atraumatic.      Right Ear: Tympanic membrane, ear canal and external ear normal.      Left Ear: Tympanic membrane, ear canal and external ear normal.      Nose: Nose normal.      Mouth/Throat:      Lips: Pink.      Mouth: Mucous membranes are moist.      Tongue: No lesions.      Palate: No mass and lesions.      Pharynx: Oropharynx is clear. Uvula midline.      Tonsils: No tonsillar exudate.   Eyes:      Conjunctiva/sclera: Conjunctivae normal.      Pupils: Pupils are equal, round, and reactive to light.   Neck:      Thyroid: No thyromegaly.   Cardiovascular:      Rate and Rhythm: Normal rate and regular rhythm.      Pulses: Normal pulses.           Dorsalis pedis pulses are 2+ on the right side and 2+ on the left side.        Posterior tibial pulses are 2+ on the right side and 2+ on the left side.      Heart sounds: Normal heart sounds. No murmur heard.  Pulmonary:      Effort: Pulmonary effort is normal.      Breath sounds: Normal breath sounds.   Abdominal:      General: Bowel sounds are normal. There is no  distension.      Palpations: Abdomen is soft.      Tenderness: There is no abdominal tenderness.   Musculoskeletal:         General: No deformity. Normal range of motion.      Cervical back: Normal range of motion and neck supple.      Right lower leg: No edema.      Left lower leg: No edema.   Lymphadenopathy:      Head:      Right side of head: No submental, submandibular, tonsillar, preauricular, posterior auricular or occipital adenopathy.      Left side of head: No submental, submandibular, tonsillar, preauricular, posterior auricular or occipital adenopathy.      Cervical: No cervical adenopathy.      Right cervical: No superficial, deep or posterior cervical adenopathy.     Left cervical: No superficial, deep or posterior cervical adenopathy.      Upper Body:      Right upper body: No supraclavicular adenopathy.      Left upper body: No supraclavicular adenopathy.   Skin:     General: Skin is warm and dry.      Capillary Refill: Capillary refill takes 2 to 3 seconds.      Findings: Rash present. No erythema. Rash is scaling.      Comments: Patient brought photo of rash when it was active to outside of right ankle. Erythematous, flat with scaling. Rash to inner ankle is erythematous linear in appearance.    Neurological:      General: No focal deficit present.      Mental Status: She is alert and oriented to person, place, and time.      Cranial Nerves: No cranial nerve deficit.      Sensory: Sensation is intact.      Motor: Motor function is intact.      Coordination: Coordination is intact.      Gait: Gait is intact.   Psychiatric:         Attention and Perception: Attention and perception normal.         Mood and Affect: Mood and affect normal.         Speech: Speech normal.         Behavior: Behavior normal. Behavior is cooperative.         Thought Content: Thought content normal.         Cognition and Memory: Cognition and memory normal.         Judgment: Judgment normal.         Procedures    Diagnoses  and all orders for this visit:    1. Annual physical exam (Primary)    2. Encounter for screening for other metabolic disorders  -     CBC & Differential  -     Comprehensive Metabolic Panel    3. Screening for hyperlipidemia  -     Lipid Panel With / Chol / HDL Ratio    4. Rash  -     Discontinue: fluticasone (FLONASE) 50 MCG/ACT nasal spray; Administer 2 sprays into the nostril(s) as directed by provider Daily.  Dispense: 9.9 mL; Refill: 2  -     fluticasone (CUTIVATE) 0.005 % ointment; Apply 1 Application topically to the appropriate area as directed 2 (Two) Times a Day.  Dispense: 15 g; Refill: 2        Problems Addressed this Visit    None  Visit Diagnoses       Annual physical exam    -  Primary    Encounter for screening for other metabolic disorders        Relevant Orders    CBC & Differential    Comprehensive Metabolic Panel    Screening for hyperlipidemia        Relevant Orders    Lipid Panel With / Chol / HDL Ratio    Rash        Relevant Medications    fluticasone (CUTIVATE) 0.005 % ointment          Diagnoses         Codes Comments    Annual physical exam    -  Primary ICD-10-CM: Z00.00  ICD-9-CM: V70.0     Encounter for screening for other metabolic disorders     ICD-10-CM: Z13.228  ICD-9-CM: V77.99     Screening for hyperlipidemia     ICD-10-CM: Z13.220  ICD-9-CM: V77.91     Rash     ICD-10-CM: R21  ICD-9-CM: 782.1           Lipid panel  CMP  CBC  Fluticasone BID to rash  Routine health maintenance reviewed and discussed with Sujey Schofield.    Preventative counseling regarding healthy diet and exercise.   Pt reports that he wears a seatbelt regularly.     Return in about 1 year (around 10/3/2025) for Annual, Labs.    Answers submitted by the patient for this visit:  Other (Submitted on 9/28/2024)  Please describe your symptoms.: Not applicable. Standard physical.  Have you had these symptoms before?: Yes  How long have you been having these symptoms?: Greater than 2 weeks  Primary Reason for Visit  (Submitted on 9/28/2024)  What is the primary reason for your visit?: Problem Not Listed

## 2024-10-04 ENCOUNTER — OFFICE VISIT (OUTPATIENT)
Dept: OBSTETRICS AND GYNECOLOGY | Age: 29
End: 2024-10-04
Payer: COMMERCIAL

## 2024-10-04 VITALS
DIASTOLIC BLOOD PRESSURE: 76 MMHG | SYSTOLIC BLOOD PRESSURE: 118 MMHG | BODY MASS INDEX: 26.07 KG/M2 | HEIGHT: 68 IN | WEIGHT: 172 LBS

## 2024-10-04 DIAGNOSIS — N92.0 MENORRHAGIA WITH REGULAR CYCLE: ICD-10-CM

## 2024-10-04 DIAGNOSIS — R51.9 NONINTRACTABLE EPISODIC HEADACHE, UNSPECIFIED HEADACHE TYPE: ICD-10-CM

## 2024-10-04 DIAGNOSIS — R10.2 PELVIC CRAMPING: ICD-10-CM

## 2024-10-04 DIAGNOSIS — D25.9 UTERINE LEIOMYOMA, UNSPECIFIED LOCATION: ICD-10-CM

## 2024-10-04 DIAGNOSIS — Z30.433 ENCOUNTER FOR REMOVAL AND REINSERTION OF IUD: Primary | ICD-10-CM

## 2024-10-04 DIAGNOSIS — N89.8 VAGINAL DISCHARGE: ICD-10-CM

## 2024-10-04 LAB
B-HCG UR QL: NEGATIVE
EXPIRATION DATE: NORMAL
INTERNAL NEGATIVE CONTROL: NORMAL
INTERNAL POSITIVE CONTROL: NORMAL
Lab: NORMAL

## 2024-10-04 NOTE — PROGRESS NOTES
"Subjective     Chief Complaint   Patient presents with    Gynecologic Exam     C/o increased pain during periods. , she has been seen in May for this but it is getting worse.  Abdominal swelling, nausea, hot and cold flashes, headaches, light sensitivity, walking fast was painful, pain went down through thighs, and could not bend forward it was so painful.  This all happens the week of her period.        Sujey Schofield is a 28 y.o.  whose LMP is Patient's last menstrual period was 2024 (exact date). presents with pain during cycle  She has had a ParaGard for almost 10 years  After her first covid shot she noticed increased bleeding and cramping with her cycles  This lasted for a year  Periods have been manageable until last year and the beginning of this year  Her period in September period was heavy and she had pain while leaning forward   Just started working nightshift in    She is interested in trying a different iud   She tried estrogen cream in the past for vaginal dryness and it gave her heart attack like sx   Contemplating pregnancy in the future           The following portions of the patient's history were reviewed and updated as appropriate:vital signs, allergies, current medications, past medical history, past social history, past surgical history, and problem list      Review of Systems   Pertinent items are noted in HPI.     Objective      /76   Ht 172.7 cm (68\")   Wt 78 kg (172 lb)   LMP 2024 (Exact Date) Comment: paragard  BMI 26.15 kg/m²     Physical Exam    General:   alert   Heart: Not performed today   Lungs: Not performed today.   Breast: Not performed today   Neck: Not performed today   Abdomen: Not performed today   CVA: Not performed today   Pelvis: Vulva and vagina appear normal. White creamy discharge is present Bimanual exam reveals normal uterus and adnexa.  Cervix: IUD string visualized at cervical os   Extremities: Extremities normal, atraumatic, no " cyanosis or edema   Neurologic: AOx3. Gait normal. Reflexes and motor strength normal and symmetric. Cranial nerves 2-12 and sensation grossly intact.   Psychiatric: Normal affect, judgement, and mood       Lab Review   Labs: Urine pregnancy test    Imaging   Ultrasound - Pelvic Vaginal  US Non-ob Transvaginal (05/17/2024 13:12)       1.  Uterus: Normal size and Anteverted    2.  Endometrium: Normal non-menopausal thickness, 2 mm , and IUD appears in normal position     3.  Myometrium: Normal homogenous texture  and Single fibroid 1.7 cm     4.  Ovaries  Left:    Normal/unremarkable   Right:  Normal/unremarkable   No ff    Assessment & Plan     ASSESSMENT  1. Encounter for removal and reinsertion of IUD    2. Pelvic cramping    3. Menorrhagia with regular cycle    4. Vaginal discharge    5. Nonintractable episodic headache, unspecified headache type    6. Uterine leiomyoma, unspecified location          PLAN  1.   Orders Placed This Encounter   Procedures    NuSwab VG+ - Swab, Vagina    POC Pregnancy, Urine       2. Medications prescribed this encounter:        New Medications Ordered This Visit   Medications    levonorgestrel (KYLEENA) 19.5 MG IUD 1 each       3. We discussed her symptoms in depth  She is wanting to try a hormonal iud  Discussed all three options for hormonal iuds  We reviewed other bcm as well  Tolerated bcp previously  The iud  will likely help with cramping and bleeding  Irregularities may occur in the first few months  Back up method reviewed for 7 days  May take motrin or tylenol as needed for pain   Very small fibroid noted on TVUS  Will call with nuswab results  I spent 30 minutes caring for Sujey Schofield on this date of service. This time includes time spent by me in the following activities: preparing for the visit, reviewing tests, obtaining and/or reviewing a separately obtained history, performing a medically appropriate examination and/or evaluation, counseling and educating the  patient/family/caregiver, ordering medications, tests, or procedures and documenting information in the medical record.  This time does NOT include time spent on separately reported services.              IUD Removal and Immediate Reinsertion    Patient's last menstrual period was 09/25/2024 (exact date).    Date of procedure:  10/4/2024    Risks and benefits discussed? yes  All questions answered? yes  Consents given by the patient  Written consent obtained? yes  Reason for removal: Side effect: bleeding and cramping      Procedure documentation:    A speculum was placed in order to view the cervix.  A tenaculum did not need to be placed on the anterior cervical lip.  Cervical dilation did not need to be performed in order to access the string.  The IUD string was easily seen.  The string was grasped and the IUD was removed without difficulty.  The IUD did not appear to be adherent to the uterine cavity. It was removed intact.    A sterile speculum was replaced and the cervix was cleansed with an antiseptic solution.  Vaginal discharge was scant.  The anterior lip of the cervix was grasped with a tenaculum and the uterine cavity was gently sounded.  There was no difficulty passing the sound through the cervix.  Cervical dilation did not need to be performed prior to placing the IUD.  The uterus was anteverted and sounded to 7 cms.  The Kyleena was then prepared per the manufacturers instructions.    The Kyleena was advanced to a point 2 cms from the fundus and then the arms were released from the sheath.  The device was advanced to the fundus and the device was released fully from the sheath.. The string was cut 3 cms in length.  Bleeding from the cervix was scant.    She tolerated the procedure without any difficulty.     Kyleena  Lot [id number]  Expiration- 09/2025    Post procedure instructions: Call if any fever or excessive bleeding or pain.    Follow up needed: 6 weeks for IUD check        Follow up: 6  week(s)    Gricelda Miramontes, APRN  10/4/2024

## 2024-10-07 LAB
A VAGINAE DNA VAG QL NAA+PROBE: NORMAL SCORE
BVAB2 DNA VAG QL NAA+PROBE: NORMAL SCORE
C ALBICANS DNA VAG QL NAA+PROBE: NEGATIVE
C GLABRATA DNA VAG QL NAA+PROBE: NEGATIVE
C TRACH DNA SPEC QL NAA+PROBE: NEGATIVE
MEGA1 DNA VAG QL NAA+PROBE: NORMAL SCORE
N GONORRHOEA DNA VAG QL NAA+PROBE: NEGATIVE
T VAGINALIS DNA VAG QL NAA+PROBE: NEGATIVE

## 2024-12-04 ENCOUNTER — OFFICE VISIT (OUTPATIENT)
Dept: OBSTETRICS AND GYNECOLOGY | Age: 29
End: 2024-12-04
Payer: COMMERCIAL

## 2024-12-04 VITALS
DIASTOLIC BLOOD PRESSURE: 70 MMHG | BODY MASS INDEX: 21.37 KG/M2 | WEIGHT: 141 LBS | HEIGHT: 68 IN | SYSTOLIC BLOOD PRESSURE: 112 MMHG

## 2024-12-04 DIAGNOSIS — Z30.431 IUD CHECK UP: Primary | ICD-10-CM

## 2024-12-04 NOTE — PROGRESS NOTES
"Subjective     Chief Complaint   Patient presents with    Gynecologic Exam     String check       Sujey Schofield is a 28 y.o.  whose LMP is Patient's last menstrual period was 2024.     Pt presents today for IUD string check  She had a two week period after having it placed, no longer bleeding  Has not had any pain  Overall happy with IUD      No Additional Complaints Reported    The following portions of the patient's history were reviewed and updated as appropriate:vital signs, allergies, current medications, past medical history, past social history, past surgical history, and problem list      Review of Systems   Pertinent items are noted in HPI.     Objective      /70   Ht 172.7 cm (68\")   Wt 64 kg (141 lb)   LMP 2024   BMI 21.44 kg/m²     Physical Exam    General:   alert and no distress   Heart: Not performed today   Lungs: Not performed today.   Breast: Not performed today   Neck: na   Abdomen: {Not performed today   CVA: Not performed today   Pelvis: External genitalia: normal general appearance  Urinary system: urethral meatus normal  Vaginal: normal mucosa without prolapse or lesions, normal without tenderness, induration or masses, and normal rugae  Cervix: normal appearance and IUD string visualized   Extremities: Not performed today   Neurologic: negative   Psychiatric: Normal affect, judgement, and mood       Lab Review   Labs: No data reviewed     Imaging   No data reviewed    Assessment & Plan     ASSESSMENT  1. IUD check up        PLAN  1. No orders of the defined types were placed in this encounter.      2. Medications prescribed this encounter:      No orders of the defined types were placed in this encounter.      3. IUD in place.     Follow up: 1 month(s) for annual exam    ROOSEVELT Nicholson  2024           "

## 2025-04-07 ENCOUNTER — OFFICE VISIT (OUTPATIENT)
Dept: FAMILY MEDICINE CLINIC | Facility: CLINIC | Age: 30
End: 2025-04-07
Payer: COMMERCIAL

## 2025-04-07 VITALS
HEIGHT: 68 IN | OXYGEN SATURATION: 97 % | BODY MASS INDEX: 26.67 KG/M2 | SYSTOLIC BLOOD PRESSURE: 122 MMHG | RESPIRATION RATE: 18 BRPM | DIASTOLIC BLOOD PRESSURE: 84 MMHG | HEART RATE: 73 BPM | WEIGHT: 176 LBS

## 2025-04-07 DIAGNOSIS — K62.5 RECTAL BLEEDING: ICD-10-CM

## 2025-04-07 DIAGNOSIS — B07.9 WART OF HAND: ICD-10-CM

## 2025-04-07 DIAGNOSIS — L29.89 CHRONIC PRURITIC RASH IN ADULT: Primary | ICD-10-CM

## 2025-04-07 PROCEDURE — 99214 OFFICE O/P EST MOD 30 MIN: CPT | Performed by: NURSE PRACTITIONER

## 2025-04-07 NOTE — PROGRESS NOTES
Subjective   Sujey Schofield is a 29 y.o. female.     Chief Complaint   Patient presents with    Plantar Warts    Rash     R leg    Black or Bloody Stool        History of Present Illness     History of Present Illness  The patient presents for evaluation of a wart on her middle finger, chronic rash on her ankle, and rectal bleeding.    She reports the presence of a wart on her middle finger, which she has been monitoring for some time.    She has been experiencing a non-pruritic rash on her ankle for over a year. Initially, the rash was pruritic but has since become more of a nuisance. The rash has shown signs of erythema. She has attempted to manage the rash with a topical cream, but this has not resulted in any improvement. She has been using Cutivate cream. She has been wearing a boot for approximately one month due to a foot fracture sustained while descending deck stairs.    She also reports rectal bleeding, which has been ongoing for an unspecified duration. The blood is bright red in color. She does not have any hemorrhoids but does have fissures. She is not experiencing any abdominal pain, nausea, vomiting, or constipation. The rectal bleeding varies in severity, sometimes only noticeable on toilet paper, while at other times it is significant enough to color the bowl red.    FAMILY HISTORY  Her grandfather had prostate cancer.  Her grandmother had pancreatic cancer.  She does not have any family history of colon cancer.    MEDICATIONS  Current: Cutivate       The following portions of the patient's history were reviewed and updated as appropriate: allergies, current medications, past family history, past medical history, past social history, past surgical history and problem list.    Review of Systems   Constitutional:  Negative for chills, fatigue and fever.   Respiratory:  Negative for cough and shortness of breath.    Cardiovascular:  Negative for chest pain, palpitations and leg swelling.    Gastrointestinal:  Positive for blood in stool. Negative for diarrhea, rectal pain, vomiting and GERD.   Musculoskeletal:  Positive for arthralgias (right foot fracture). Negative for joint swelling, neck pain and neck stiffness.   Skin:  Positive for rash (right ankle).        Wart middle right finger   Neurological:  Negative for dizziness and headache.   Hematological: Negative.    Psychiatric/Behavioral: Negative.  Negative for sleep disturbance.        Objective   Physical Exam  Vitals and nursing note reviewed.   Constitutional:       Appearance: She is well-developed.   HENT:      Head: Normocephalic and atraumatic.   Eyes:      Conjunctiva/sclera: Conjunctivae normal.      Pupils: Pupils are equal, round, and reactive to light.   Cardiovascular:      Rate and Rhythm: Normal rate and regular rhythm.      Heart sounds: Normal heart sounds. No murmur heard.  Pulmonary:      Effort: Pulmonary effort is normal.      Breath sounds: Normal breath sounds.   Neurological:      Mental Status: She is alert and oriented to person, place, and time.   Psychiatric:         Behavior: Behavior normal.         Thought Content: Thought content normal.         Judgment: Judgment normal.         Vitals:    04/07/25 1347   BP: 122/84   Pulse: 73   Resp: 18   SpO2: 97%     Body mass index is 26.76 kg/m².      Cryotherapy, Skin Lesion    Date/Time: 4/7/2025 2:23 PM    Performed by: Gricelda Sue APRN  Authorized by: Gricelda Sue APRN  Consent: Verbal consent obtained  Consent given by: patient  Patient identity confirmed: verbally with patient  Local anesthesia used: no    Anesthesia:  Local anesthesia used: no    Sedation:  Patient sedated: no    Patient tolerance: patient tolerated the procedure well with no immediate complications          Assessment & Plan   Problems Addressed this Visit    None  Visit Diagnoses         Chronic pruritic rash in adult    -  Primary    Relevant Orders    Ambulatory Referral to  Dermatology (Completed)      Wart of hand          Rectal bleeding        Relevant Orders    Ambulatory Referral to Gastroenterology          Diagnoses         Codes Comments      Chronic pruritic rash in adult    -  Primary ICD-10-CM: L29.89  ICD-9-CM: 698.8       Wart of hand     ICD-10-CM: B07.9  ICD-9-CM: 078.10       Rectal bleeding     ICD-10-CM: K62.5  ICD-9-CM: 569.3             Assessment & Plan  1. Wart on the middle finger.  Cryotherapy was performed today to treat the wart. She was advised that the wart may blister and fall off, and sometimes a second treatment may be necessary. She was instructed to avoid picking at the blister and to use a Band-Aid if needed.    2. Chronic rash on the ankle.  A referral to dermatology was made for further evaluation and management. A steroid cream was recommended as an initial treatment. If the steroid cream does not improve the condition, dermatology will provide further assessment and treatment options.    3. Rectal bleeding.  A referral to a female gastroenterologist was made for further evaluation and management. She reported bright red blood in her stool, which varies from being on the toilet paper to filling the bowl. There is no associated abdominal pain, nausea, vomiting, or constipation. She does not have a history of colon cancer in the family. If symptoms worsen or become severe, patient advised to go to ER.     PROCEDURE  Cryotherapy was performed today to treat the wart on her middle finger.            Return for Next scheduled follow up.    Patient or patient representative verbalized consent for the use of Ambient Listening during the visit with  ROOSEVELT Ho for chart documentation. 4/7/2025  14:22 EDT

## 2025-05-22 ENCOUNTER — OFFICE VISIT (OUTPATIENT)
Dept: FAMILY MEDICINE CLINIC | Facility: CLINIC | Age: 30
End: 2025-05-22
Payer: COMMERCIAL

## 2025-05-22 VITALS
BODY MASS INDEX: 26.07 KG/M2 | OXYGEN SATURATION: 98 % | DIASTOLIC BLOOD PRESSURE: 82 MMHG | RESPIRATION RATE: 18 BRPM | SYSTOLIC BLOOD PRESSURE: 116 MMHG | WEIGHT: 172 LBS | HEART RATE: 74 BPM | HEIGHT: 68 IN
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
## 2025-05-22 RX ORDER — DOXYCYCLINE 100 MG/1
100 CAPSULE ORAL 2 TIMES DAILY
Qty: 14 CAPSULE | Refills: 0 | Status: SHIPPED | OUTPATIENT
Start: 2025-05-22 | End: 2025-05-29

## 2025-05-22 NOTE — PATIENT INSTRUCTIONS
Central scheduling called to say the order for the mammogram should be in there as one order, 3-D Bilateral Diagnostic Mammogram.   And she said the patient needs a Breast Ultrasound too. Return if symptoms worsen or fail to improve.  I am prescribing you an antibiotic, it is important that you take all of this medication even if you are feeling better

## 2025-05-22 NOTE — PROGRESS NOTES
Subjective   Sujey Schofield is a 29 y.o. female.     Chief Complaint   Patient presents with    Tick Removal     Tick bite on L side of upper thigh        History of Present Illness     History of Present Illness  The patient presents for a tick bite.    She reports discovering a tick in her groin area on 05/21/2025, initially mistaking it for a pimple and attempting to squeeze it. She is uncertain about the duration of the tick's presence on her skin. She has not conducted any self-examination of the affected area since the incident.       The following portions of the patient's history were reviewed and updated as appropriate: allergies, current medications, past family history, past medical history, past social history, past surgical history and problem list.    Review of Systems   Constitutional:  Negative for chills, fatigue and fever.   Respiratory:  Negative for cough, chest tightness, shortness of breath and wheezing.    Cardiovascular:  Negative for chest pain, palpitations and leg swelling.   Skin:         Small red open area to inner left thigh.    Neurological:  Negative for dizziness and headache.   Hematological: Negative.    Psychiatric/Behavioral: Negative.  Negative for sleep disturbance.        Objective   Physical Exam  Vitals and nursing note reviewed.   Constitutional:       Appearance: She is well-developed.   HENT:      Head: Normocephalic and atraumatic.   Eyes:      Conjunctiva/sclera: Conjunctivae normal.      Pupils: Pupils are equal, round, and reactive to light.   Cardiovascular:      Rate and Rhythm: Normal rate and regular rhythm.      Heart sounds: Normal heart sounds. No murmur heard.  Pulmonary:      Effort: Pulmonary effort is normal.      Breath sounds: Normal breath sounds.   Skin:            Comments: Small erythematous pin sized wound to inner left thigh.    Neurological:      Mental Status: She is alert and oriented to person, place, and time.   Psychiatric:         Behavior:  Behavior normal.         Thought Content: Thought content normal.         Judgment: Judgment normal.         Vitals:    05/22/25 1622   BP: 116/82   Pulse: 74   Resp: 18   SpO2: 98%     Body mass index is 26.15 kg/m².      Procedures    Assessment & Plan   Problems Addressed this Visit    None  Visit Diagnoses         Tick bite, unspecified site, initial encounter    -  Primary    Relevant Medications    doxycycline (VIBRAMYCIN) 100 MG capsule          Diagnoses         Codes Comments      Tick bite, unspecified site, initial encounter    -  Primary ICD-10-CM: W57.XXXA  ICD-9-CM: 919.4, E906.4             Assessment & Plan  1. Tick bite.  - The patient reported finding a tick near the groin area yesterday.  - The duration of the tick's attachment is unknown.  - Physical examination revealed no significant redness or signs of infection at the bite site.  - Doxycycline 100 mg prescribed, to be taken twice daily for 7 days. The patient is advised to take the medication with food and to discontinue her multivitamin regimen during this period.              Return if symptoms worsen or fail to improve.    Patient or patient representative verbalized consent for the use of Ambient Listening during the visit with  ROOSEVELT Ho for chart documentation. 5/22/2025  16:31 EDT